# Patient Record
Sex: MALE | Race: WHITE
[De-identification: names, ages, dates, MRNs, and addresses within clinical notes are randomized per-mention and may not be internally consistent; named-entity substitution may affect disease eponyms.]

---

## 2018-06-16 ENCOUNTER — HOSPITAL ENCOUNTER (EMERGENCY)
Dept: HOSPITAL 97 - ER | Age: 43
Discharge: ANOTHER HOSPITAL | End: 2018-06-16
Payer: COMMERCIAL

## 2018-06-16 VITALS — SYSTOLIC BLOOD PRESSURE: 121 MMHG | OXYGEN SATURATION: 98 % | DIASTOLIC BLOOD PRESSURE: 78 MMHG

## 2018-06-16 VITALS — TEMPERATURE: 97.7 F

## 2018-06-16 DIAGNOSIS — T15.01XA: ICD-10-CM

## 2018-06-16 DIAGNOSIS — F17.210: ICD-10-CM

## 2018-06-16 DIAGNOSIS — D69.3: Primary | ICD-10-CM

## 2018-06-16 LAB
ALBUMIN SERPL BCP-MCNC: 3.8 G/DL (ref 3.2–5.5)
ALP SERPL-CCNC: 79 IU/L (ref 42–121)
ALT SERPL W P-5'-P-CCNC: 19 IU/L (ref 10–60)
AST SERPL W P-5'-P-CCNC: 26 IU/L (ref 10–42)
BLD SMEAR INTERP: (no result)
BUN BLD-MCNC: 11 MG/DL (ref 6–20)
GLUCOSE SERPLBLD-MCNC: 96 MG/DL (ref 65–120)
HCT VFR BLD CALC: 45.9 % (ref 39.6–49)
LIPASE SERPL-CCNC: 32 U/L (ref 22–51)
LYMPHOCYTES # SPEC AUTO: 2 K/UL (ref 0.7–4.9)
MCH RBC QN AUTO: 28.9 PG (ref 27–35)
MCV RBC: 85.6 FL (ref 80–100)
MORPHOLOGY BLD-IMP: (no result)
PMV BLD: 11.8 FL (ref 7.6–11.3)
POTASSIUM SERPL-SCNC: 4.3 MEQ/L (ref 3.6–5)
RBC # BLD: 5.37 M/UL (ref 4.33–5.43)

## 2018-06-16 PROCEDURE — 08C8XZZ EXTIRPATION OF MATTER FROM RIGHT CORNEA, EXTERNAL APPROACH: ICD-10-PCS

## 2018-06-16 PROCEDURE — 85025 COMPLETE CBC W/AUTO DIFF WBC: CPT

## 2018-06-16 PROCEDURE — 70450 CT HEAD/BRAIN W/O DYE: CPT

## 2018-06-16 PROCEDURE — 83690 ASSAY OF LIPASE: CPT

## 2018-06-16 PROCEDURE — 99285 EMERGENCY DEPT VISIT HI MDM: CPT

## 2018-06-16 PROCEDURE — 36415 COLL VENOUS BLD VENIPUNCTURE: CPT

## 2018-06-16 PROCEDURE — 80076 HEPATIC FUNCTION PANEL: CPT

## 2018-06-16 PROCEDURE — 80048 BASIC METABOLIC PNL TOTAL CA: CPT

## 2018-06-16 NOTE — RAD REPORT
EXAM DESCRIPTION:  CT - Head Brain Wo Cont - 6/16/2018 4:32 pm

 

CLINICAL HISTORY:  Headache

 

COMPARISON:  November 2017

 

TECHNIQUE:  Computed axial tomography of the head was obtained. IV contrast was not requested.

 

All CT scans are performed using dose optimization technique as appropriate and may include automated
 exposure control or mA/KV adjustment according to patient size.

 

FINDINGS:  An intracranial  bleed is not seen .

 

The ventricles are normal in caliber.

 

No extra-axial fluid collection is noted.

 

Moderate mucoperiosteal thickening of the ethmoid sinus is present

 

IMPRESSION:  No acute intracranial abnormality is seen. If patient's symptoms persist  MRI of the bra
in would be recommended.

 

Moderate chronic ethmoid sinusitis

## 2018-06-16 NOTE — XMS REPORT
[facility]

 Created on:2018



Patient:Lisandro Good

Sex:Male

:1975

External Reference #:[id number]





Demographics







 Address  [address] [place] [id number]

 

 Phone  [phone]

 

 Preferred Language  en

 

 Marital Status  Unknown

 

 Oriental orthodox Affiliation  Unknown

 

 Race  Other Race

 

 Ethnic Group  Not  or 









Author







 Organization  [employer]









Care Team Providers







 Name  Role  Phone

 

 John Benítez  Provider Role  Unavailable









Allergies

No Known Allergies



Problems







 Problem Type  Condition  Code  Onset Dates  Condition Status

 

 Problem  Depression with anxiety  F41.8    Active

 

 Problem  Condyloma acuminata  A63.0    Active

 

 Problem  Thrombocytopenia  D69.6    Active

 

 Problem  Migraine without aura and without  G43.009    Active



   status migrainosus, not intractable      

 

 Problem  Chronic ITP (idiopathic  D69.3    Active



   thrombocytopenia)      

 

 Problem  Tobacco use disorder  F17.200    Active

 

 Problem  Osteoarthritis of multiple joints  M15.9    Active







Medications

No Known Medications



Results

No Known Results



Summary Purpose

[employer] Submission

## 2018-06-16 NOTE — XMS REPORT
[facility]

 Created on:2018



Patient:Lisandro Good

Sex:Male

:1975

External Reference #:[id number]





Demographics







 Address  [address] [place] [address]



   [place] [address]

 

 Phone  [phone]

 

 Preferred Language  en

 

 Marital Status  Unknown

 

 Moravian Affiliation  Unknown

 

 Race  Other Race

 

 Ethnic Group  Not  or 









Author







 Organization  [employer]









Care Team Providers







 Name  Role  Phone

 

 John Benítez  Provider Role  Unavailable









Allergies, Adverse Reactions, Alerts







 Substance  Reaction  Event Type

 

 N.K.D.A.  Info Not Available  Non Drug Allergy







Problems







 Problem Type  Condition  Code  Onset Dates  Condition Status

 

 Assessment  Depression with anxiety  F41.8    Active

 

 Assessment  Tooth pain  K08.89    Active

 

 Assessment  Chronic ITP (idiopathic  D69.3    Active



   thrombocytopenia)      

 

 Problem  Depression with anxiety  F41.8    Active

 

 Problem  Condyloma acuminata  A63.0    Active

 

 Problem  Thrombocytopenia  D69.6    Active

 

 Problem  Migraine without aura and without  G43.009    Active



   status migrainosus, not intractable      

 

 Problem  Chronic ITP (idiopathic  D69.3    Active



   thrombocytopenia)      

 

 Problem  Tobacco use disorder  F17.200    Active

 

 Problem  Osteoarthritis of multiple joints  M15.9    Active

 

 Assessment  Condyloma acuminata  A63.0    Active

 

 Assessment  Migraine without aura and without  G43.009    Active



   status migrainosus, not intractable      

 

 Assessment  Tobacco use disorder  F17.200    Active







Medications







 Medication  Code System  Code  Instructions  Start  End Date  Status  Dosage



         Date      

 

 Ibuprofen  NDC  95204043429  400 MG Orally      Active  1 tablet



       Three times a day        with food



               or milk as



               needed

 

 Norco  NDC  [id number]  5-325 MG Orally      Active  1 tablet



       every 6 hrs        as needed







Results

No Known Results



Summary Purpose

[employer] Submission

## 2018-06-16 NOTE — ER
Nurse's Notes                                                                                     

 [facility]                                                                

Name: Lisandro Good                                                                                

Age: 42 yrs                                                                                       

Sex: Male                                                                                         

: 1975                                                                                   

MRN: [id number]                                                                                   

Arrival Date: 2018                                                                          

Time: 10:58                                                                                       

Account#: [id number]                                                                            

Bed 7                                                                                             

Private MD: Osiel Godwin                                                                         

Diagnosis: Immune thrombocytopenic purpura                                                        

                                                                                                  

Presentation:                                                                                     

                                                                                             

11:14 Presenting complaint: Patient states: FB in left eye, vomiting and dizziness started    sv  

      Tuesday, "I think it's my ITP messing with me.". Transition of care: patient was not        

      received from another setting of care. Onset of symptoms was 2018. Care prior      

      to arrival: None.                                                                           

11:14 Method Of Arrival: Ambulatory                                                           sv  

11:14 Acuity: LADY 3                                                                           sv  

21:05 Risk Assessment: Do you want to hurt yourself or someone else? Patient reports no       tl2 

      desire to harm self or others. Initial Sepsis Screen: Does the patient meet any 2           

      criteria? No. Patient's initial sepsis screen is negative. Does the patient have a          

      suspected source of infection? No. Patient's initial sepsis screen is negative.             

                                                                                                  

Historical:                                                                                       

- Allergies:                                                                                      

11:15 No Known Allergies;                                                                     sv  

- Home Meds:                                                                                      

11:15 None [Active];                                                                          sv  

- PMHx:                                                                                           

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  

- PSHx:                                                                                           

11:15 rhinoplasty; Tonsillectomy; Adenoids; Appendectomy;                                     sv  

                                                                                                  

- Immunization history:: Adult Immunizations up to date.                                          

- Social history:: Smoking status: Patient uses tobacco products, smokes one pack                 

  cigarettes per day.                                                                             

- Ebola Screening: : No symptoms or risks identified at this time.                                

                                                                                                  

                                                                                                  

Screenin:50 Abuse screen: Denies threats or abuse. Denies injuries from another. Nutritional        sg  

      screening: No deficits noted. Tuberculosis screening: No symptoms or risk factors           

      identified. Never had TB. Fall Risk None identified.                                        

                                                                                                  

Assessment:                                                                                       

12:10 General: Appears in no apparent distress. comfortable, well groomed, well developed,    sg  

      well nourished, Behavior is calm, cooperative, appropriate for age. Pain: Denies pain.      

      Neuro: Level of Consciousness is awake, alert, obeys commands, Oriented to person,          

      place, time, situation,  are equal bilaterally Moves all extremities. Full             

      function Gait is steady, Speech is normal, Facial symmetry appears normal, Reports          

      dizziness, weakness. Cardiovascular: No deficits noted. Reports fatigue,                    

      lightheadedness, nausea, vomiting, Denies chest pain, shortness of breath, vomiting,        

      Heart tones S1 S2 present Capillary refill is brisk in bilateral fingers Patient's skin     

      is warm and dry. Chest pain is denied. Respiratory: No deficits noted. GI: Abdomen is       

      flat, non-distended, Bowel sounds present X 4 quads. : No signs and/or symptoms were      

      reported regarding the genitourinary system. EENT: Eyes are tearing on left eye a dark      

      colored pinpoint sized veronica is noted on the sclera . Sclera/Cornea are reddened in left     

      eye Reports pain in left eye. Derm: Skin is pink, warm \T\ dry. Musculoskeletal: No signs   

      and/or symptoms reported regarding the musculoskeletal system.                              

19:40 Reassessment: attempted to call report, instructed to call back in 10 minutes.          tl2 

20:25 Reassessment: pt c/o pain, Dr. Duarte VO for another dose of Norco 5. Awaiting        tl2 

      transport.                                                                                  

21:04 Reassessment: Patient appears in no apparent distress at this time. Patient and/or      tl2 

      family updated on plan of care and expected duration. Pain level reassessed. Patient is     

      alert, oriented x 3, equal unlabored respirations, skin warm/dry/pink. Pt stable and        

      ready for transport.                                                                        

                                                                                                  

Vital Signs:                                                                                      

11:15  / 82; Pulse 63; Resp 18; Temp 97.7; Pulse Ox 97% ; Weight 81.65 kg; Height 5 ft. sv  

      11 in. (180.34 cm); Pain 5/10;                                                              

19:48  / 78; Pulse 73; Resp 18; Pulse Ox 98% on R/A;                                    tl2 

11:15 Body Mass Index 25.10 (81.65 kg, 180.34 cm)                                             sv  

                                                                                                  

ED Course:                                                                                        

10:58 Patient arrived in ED.                                                                  sb2 

10:59 Osiel Godwin MD is Private Physician.                                                 sb2 

11:15 Triage completed.                                                                       sv  

11:16 Arm band placed on right wrist. Patient placed in waiting room, Patient notified of     sv  

      wait time.                                                                                  

11:45 Eddie Franklin MD is Attending Physician.                                              gs  

11:48 Teto Mccoy, RN is Primary Nurse.                                                       sg  

13:50 Initial lab(s) drawn, by me, sent to lab. Inserted saline lock: 20 gauge in right       sg  

      antecubital area, using aseptic technique. Blood collected.                                 

16:14 transfer approval from receiving facility.                                              sg  

16:31 CT Head Brain wo Cont In Process Unspecified.                                           EDMS

16:31 CT completed. Patient moved to CT via wheelchair. Patient moved back from CT.           cw1 

21:04 Lisa Villalta, RN is Primary Nurse.                                                      tl2 

21:04 Patient has correct armband on for positive identification. Bed in low position. Call   tl2 

      light in reach. Side rails up X 1.                                                          

21:04 No provider procedures requiring assistance completed. Patient transferred, IV remains  tl2 

      in place.                                                                                   

                                                                                                  

Administered Medications:                                                                         

13:30 Drug: Tetracaine Drops 0.5 % 1 drops {Note: medication administered by .}         

      Route: Ophthalmic; Site: right eye;                                                         

16:30 Drug: Zofran 4 mg Route: PO;                                                            sg  

21:06 Follow up: Response: No adverse reaction; Nausea is decreased                           tl2 

16:30 Drug: Norco 5 mg-325 mg 1 tabs Route: PO;                                               sg  

20:29 Drug: Norco 5 mg-325 mg 1 tabs Route: PO;                                               tl2 

21:06 Follow up: Response: No adverse reaction; Medication administered at discharge.         tl2 

                                                                                                  

                                                                                                  

Outcome:                                                                                          

17:46 ER care complete, transfer ordered by MD.                                                 

21:04 Transferred by ground EMS to [facility], Transfer form completed.             tl2 

21:04 Condition: stable                                                                           

21:04 Discharge instructions given to patient, family, Instructed on the need for transfer.       

21:07 Patient left the ED.                                                                    tl2 

                                                                                                  

Signatures:                                                                                       

Dispatcher [employer]                           EDMS                                                 

Heidi Rodriguez RN RN sv Gay, Steven, RN RN sg Woodley, Crystal                             cw1                                                  

Lisa Villalta RN RN   tl2                                                  

Eddie Franklin MD MD                                                      

Lexi Guadarrama                               sb2                                                  

                                                                                                  

Corrections: (The following items were deleted from the chart)                                    

19:17 12:10 EENT: No signs and/or symptoms were reported regarding the EENT system. sg        sg  

                                                                                                  

**************************************************************************************************

## 2018-06-16 NOTE — EDPHYS
Physician Documentation                                                                           

 [facility]                                                                

Name: Lisandro Good                                                                                

Age: 42 yrs                                                                                       

Sex: Male                                                                                         

: 1975                                                                                   

MRN: [id number]                                                                                   

Arrival Date: 2018                                                                          

Time: 10:58                                                                                       

Account#: [id number]                                                                            

Bed 7                                                                                             

Private MD: Osiel Godwin                                                                         

ED Physician Eddie Franklin                                                                       

HPI:                                                                                              

                                                                                             

17:16 This 42 yrs old  Male presents to ER via Ambulatory with complaints of         gs  

      Vomiting, Dizziness.                                                                        

17:16 Onset: The symptoms/episode began/occurred gradually, 3 day(s) ago. Possible causes:    gs  

      thinks its his itp. The symptoms are aggravated by nothing. The symptoms are alleviated     

      by nothing.                                                                                 

17:32 Associated signs and symptoms: Pertinent negatives: fever. Severity of symptoms: At     gs  

      their worst the symptoms were moderate in the emergency department the symptoms are         

      unchanged. The patient has experienced similar episodes in the past, a few times.           

                                                                                                  

Historical:                                                                                       

- Allergies:                                                                                      

11:15 No Known Allergies;                                                                     sv  

- Home Meds:                                                                                      

11:15 None [Active];                                                                          sv  

- PMHx:                                                                                           

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  

- PSHx:                                                                                           

11:15 rhinoplasty; Tonsillectomy; Adenoids; Appendectomy;                                     sv  

                                                                                                  

- Immunization history:: Adult Immunizations up to date.                                          

- Social history:: Smoking status: Patient uses tobacco products, smokes one pack                 

  cigarettes per day.                                                                             

- Ebola Screening: : No symptoms or risks identified at this time.                                

                                                                                                  

                                                                                                  

ROS:                                                                                              

17:32 Eyes: Positive for foreign body sensation, started 3 days ago, Negative for redness,    gs  

      swelling, vision loss.                                                                      

17:32 All other systems are negative.                                                             

                                                                                                  

Exam:                                                                                             

17:32 Head/Face:  Normocephalic, atraumatic. ENT:  Nares patent. No nasal discharge, no       gs  

      septal abnormalities noted.  Tympanic membranes are normal and external auditory canals     

      are clear.  Oropharynx with no redness, swelling, or masses, exudates, or evidence of       

      obstruction, uvula midline.  Mucous membranes moist. Neck:  Trachea midline, no             

      thyromegaly or masses palpated, and no cervical lymphadenopathy.  Supple, full range of     

      motion without nuchal rigidity, or vertebral point tenderness.  No Meningismus.             

      Chest/axilla:  Normal chest wall appearance and motion.  Nontender with no deformity.       

      No lesions are appreciated. Cardiovascular:  Regular rate and rhythm with a normal S1       

      and S2.  No gallops, murmurs, or rubs.  Normal PMI, no JVD.  No pulse deficits.             

      Respiratory:  Lungs have equal breath sounds bilaterally, clear to auscultation and         

      percussion.  No rales, rhonchi or wheezes noted.  No increased work of breathing, no        

      retractions or nasal flaring. Back:  No spinal tenderness.  No costovertebral               

      tenderness.  Full range of motion. Skin:  Warm, dry with normal turgor.  Normal color       

      with no rashes, no lesions, and no evidence of cellulitis. MS/ Extremity:  Pulses           

      equal, no cyanosis.  Neurovascular intact.  Full, normal range of motion. Neuro:  Awake     

      and alert, GCS 15, oriented to person, place, time, and situation.  Cranial nerves          

      II-XII grossly intact.  Motor strength 5/5 in all extremities.  Sensory grossly intact.     

       Cerebellar exam normal.  Normal gait.                                                      

17:32 Constitutional: The patient appears alert, awake.                                           

17:32 Eyes: Conjunctiva: normal, Corneas: foreign body, on the right, at  9 o'clock, a piece      

      of metal.                                                                                   

17:32 Skin: no rash present.                                                                      

                                                                                                  

Vital Signs:                                                                                      

11:15  / 82; Pulse 63; Resp 18; Temp 97.7; Pulse Ox 97% ; Weight 81.65 kg; Height 5 ft. sv  

      11 in. (180.34 cm); Pain 5/10;                                                              

19:48  / 78; Pulse 73; Resp 18; Pulse Ox 98% on R/A;                                    tl2 

11:15 Body Mass Index 25.10 (81.65 kg, 180.34 cm)                                             sv  

                                                                                                  

Procedures:                                                                                       

17:32 Foreign Body Removal: a piece of metal, from the right eye, cornea without use of slit  gs  

      lamp by needle, The patient tolerated the removal well, only partial removal.               

                                                                                                  

MDM:                                                                                              

13:03 Patient medically screened.                                                               

17:32 Data reviewed: vital signs, nurses notes. ED course: transfer after dr barone and brodie     

      want transferred.                                                                           

                                                                                                  

                                                                                             

13:10 Order name: Basic Metabolic Panel; Complete Time: 14:40                                   

                                                                                             

13:10 Order name: CBC with Diff; Complete Time: 15:01                                           

                                                                                             

13:10 Order name: Hepatic Function; Complete Time: 14:40                                        

                                                                                             

13:10 Order name: Lipase; Complete Time: 14:40                                                  

                                                                                             

14:54 Order name: CBC Smear Scan; Complete Time: 15:01                                        EDMS

                                                                                             

16:22 Order name: CT Head Brain wo Cont; Complete Time: 17:01                                   

                                                                                             

13:10 Order name: IV Saline Lock; Complete Time: 14:03                                          

                                                                                             

13:10 Order name: Labs collected and sent; Complete Time: 14:03                                 

                                                                                                  

Administered Medications:                                                                         

13:30 Drug: Tetracaine Drops 0.5 % 1 drops {Note: medication administered by }         

      Route: Ophthalmic; Site: right eye;                                                         

16:30 Drug: Zofran 4 mg Route: PO;                                                            sg  

21:06 Follow up: Response: No adverse reaction; Nausea is decreased                           tl2 

16:30 Drug: Norco 5 mg-325 mg 1 tabs Route: PO;                                               sg  

20:29 Drug: Norco 5 mg-325 mg 1 tabs Route: PO;                                               tl2 

21:06 Follow up: Response: No adverse reaction; Medication administered at discharge.         tl2 

                                                                                                  

                                                                                                  

Disposition:                                                                                      

18 17:46 Transfer ordered to [facility]. Diagnosis is Immune     

  thrombocytopenic purpura.                                                                       

- Reason for transfer: Higher level of care.                                                      

- Accepting physician is puja.                                                                  

- Condition is Stable.                                                                            

- Problem is an acute exacerbation.                                                               

- Symptoms are unchanged.                                                                         

                                                                                                  

                                                                                                  

                                                                                                  

Signatures:                                                                                       

Dispatcher [employer]                           [facility]                                                 

Heidi Rodriguez RN RN                                                      

Teto Mccoy RN RN                                                      

Lisa Villalta RN RN   2                                                  

Eddie Franklin MD MD                                                      

                                                                                                  

Corrections: (The following items were deleted from the chart)                                    

21:07 17:46 2018 17:46 Transfer ordered to [facility].       tl2 

      Diagnosis is Immune thrombocytopenic purpura. Reason for transfer: Higher level of          

      care. Accepting physician is puja. Condition is Stable. Problem is an acute               

      exacerbation. Symptoms are unchanged.                                                     

                                                                                                  

**************************************************************************************************

## 2019-09-27 ENCOUNTER — HOSPITAL ENCOUNTER (EMERGENCY)
Dept: HOSPITAL 97 - ER | Age: 44
Discharge: HOME | End: 2019-09-27
Payer: COMMERCIAL

## 2019-09-27 DIAGNOSIS — D69.3: Primary | ICD-10-CM

## 2019-09-27 DIAGNOSIS — F17.210: ICD-10-CM

## 2019-09-27 LAB
HCT VFR BLD CALC: 46.7 % (ref 39.6–49)
LYMPHOCYTES # SPEC AUTO: 2.9 K/UL (ref 0.7–4.9)
PMV BLD: 10.9 FL (ref 7.6–11.3)
RBC # BLD: 5.44 M/UL (ref 4.33–5.43)

## 2019-09-27 PROCEDURE — 36430 TRANSFUSION BLD/BLD COMPNT: CPT

## 2019-09-27 PROCEDURE — 86850 RBC ANTIBODY SCREEN: CPT

## 2019-09-27 PROCEDURE — 30233R1 TRANSFUSION OF NONAUTOLOGOUS PLATELETS INTO PERIPHERAL VEIN, PERCUTANEOUS APPROACH: ICD-10-PCS

## 2019-09-27 PROCEDURE — 85025 COMPLETE CBC W/AUTO DIFF WBC: CPT

## 2019-09-27 PROCEDURE — 36415 COLL VENOUS BLD VENIPUNCTURE: CPT

## 2019-09-27 PROCEDURE — 86901 BLOOD TYPING SEROLOGIC RH(D): CPT

## 2019-09-27 PROCEDURE — 96374 THER/PROPH/DIAG INJ IV PUSH: CPT

## 2019-09-27 PROCEDURE — 86900 BLOOD TYPING SEROLOGIC ABO: CPT

## 2019-09-27 PROCEDURE — 99285 EMERGENCY DEPT VISIT HI MDM: CPT

## 2019-09-27 NOTE — EDPHYS
Physician Documentation                                                                           

 [facility]                                                                 

Name: Lisandro Good                                                                                

Age: 44 yrs                                                                                       

Sex: Male                                                                                         

: 1975                                                                                   

MRN: [id number]                                                                                   

Arrival Date: 2019                                                                          

Time: 12:11                                                                                       

Account#: [id number]                                                                            

Bed 24                                                                                            

Private MD:                                                                                       

ED Physician Mahendra Benoit                                                                         

HPI:                                                                                              

                                                                                             

12:31 This 44 yrs old  Male presents to ER via Ambulatory with complaints of         jr8 

      Abnormal Lab Results.                                                                       

12:31 Onset: The symptoms/episode began/occurred today. The patient has experienced similar   jr8 

      episodes in the past. The patient has been recently seen by a physician: Dr. rodriguez. Pt       

      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 Sent to ER for             

      steroids, pt denies any new onset pain or bleeding.                                         

                                                                                                  

Historical:                                                                                       

- Allergies:                                                                                      

12:13 No Known Allergies;                                                                     aj1 

- Home Meds:                                                                                      

12:13 None [Active];                                                                          aj1 

- PMHx:                                                                                           

12:13 ITP;                                                                                    aj1 

- PSHx:                                                                                           

12:13 Appendectomy;                                                                           aj1 

                                                                                                  

- Immunization history:: Flu vaccine is not up to date.                                           

- Social history:: Smoking status: Patient uses tobacco products, smokes two packs                

  cigarettes per day.                                                                             

- Ebola Screening: : Patient denies travel to an Ebola-affected area in the 21 days               

  before illness onset.                                                                           

                                                                                                  

                                                                                                  

ROS:                                                                                              

12:31 Constitutional: Negative for fever, chills, and weight loss, Eyes: Negative for injury, jr8 

      pain, redness, and discharge, ENT: Negative for injury, pain, and discharge, Neck:          

      Negative for injury, pain, and swelling, Cardiovascular: Negative for chest pain,           

      palpitations, and edema, Respiratory: Negative for shortness of breath, cough,              

      wheezing, and pleuritic chest pain, Abdomen/GI: Negative for abdominal pain, nausea,        

      vomiting, diarrhea, and constipation, MS/Extremity: Negative for injury and deformity,      

      Neuro: Negative for headache, weakness, numbness, tingling, and seizure.                    

                                                                                                  

Exam:                                                                                             

12:31 Constitutional:  This is a well developed, well nourished patient who is awake, alert,  jr8 

      and in no acute distress. Head/Face:  Normocephalic, atraumatic. Eyes:  Pupils equal        

      round and reactive to light, extra-ocular motions intact.  Lids and lashes normal.          

      Conjunctiva and sclera are non-icteric and not injected.  Cornea within normal limits.      

      Periorbital areas with no swelling, redness, or edema. ENT:  Nares patent. No nasal         

      discharge, no septal abnormalities noted.  Tympanic membranes are normal and external       

      auditory canals are clear.  Oropharynx with no redness, swelling, or masses, exudates,      

      or evidence of obstruction, uvula midline.  Mucous membranes moist. Neck:  Trachea          

      midline, no thyromegaly or masses palpated, and no cervical lymphadenopathy.  Supple,       

      full range of motion without nuchal rigidity, or vertebral point tenderness.  No            

      Meningismus. Chest/axilla:  Normal chest wall appearance and motion.  Nontender with no     

      deformity.  No lesions are appreciated. Cardiovascular:  Regular rate and rhythm with a     

      normal S1 and S2.  No gallops, murmurs, or rubs.  Normal PMI, no JVD.  No pulse             

      deficits. Respiratory:  Lungs have equal breath sounds bilaterally, clear to                

      auscultation and percussion.  No rales, rhonchi or wheezes noted.  No increased work of     

      breathing, no retractions or nasal flaring. Abdomen/GI:  Soft, non-tender, with normal      

      bowel sounds.  No distension or tympany.  No guarding or rebound.  No evidence of           

      tenderness throughout. Back:  No spinal tenderness.  No costovertebral tenderness.          

      Full range of motion. Skin:  Warm, dry with normal turgor.  Normal color with no            

      rashes, no lesions, and no evidence of cellulitis. Neuro:  Awake and alert, GCS 15,         

      oriented to person, place, time, and situation.  Cranial nerves II-XII grossly intact.      

      Motor strength 5/5 in all extremities.  Sensory grossly intact.  Cerebellar exam            

      normal.  Normal gait.                                                                       

                                                                                                  

Vital Signs:                                                                                      

12:13  / 75; Pulse 71; Resp 18; Temp 98.2; Pulse Ox 97% on R/A; Weight 81.65 kg (R);    aj1 

      Height 5 ft. 10 in. (177.80 cm) (R); Pain 0/10;                                             

13:30  / 89; Pulse 69; Resp 18; Temp 98.4; Pulse Ox 100% on R/A;                        mg2 

14:30  / 70; Pulse 71; Resp 18; Pulse Ox 100% ;                                         mg2 

15:40  / 92; Pulse 71; Resp 18; Pulse Ox 100% on R/A;                                   mg2 

16:20  / 71; Pulse 70; Resp 18; Temp 98; Pulse Ox 100% on R/A;                          mg2 

12:13 Body Mass Index 25.83 (81.65 kg, 177.80 cm)                                             aj1 

                                                                                                  

MDM:                                                                                              

12:41 Patient medically screened.                                                             jr8 

16:29 Data reviewed: vital signs, nurses notes, lab test result(s), and as a result, I will   jr8 

      discharge patient. Data interpreted: Pulse oximetry: on room air is 100 %.                  

      Interpretation: normal. Counseling: I had a detailed discussion with the patient and/or     

      guardian regarding: the historical points, exam findings, and any diagnostic results        

      supporting the discharge/admit diagnosis, lab results, the need for outpatient follow       

      up, oncology, to return to the emergency department if symptoms worsen or persist or if     

      there are any questions or concerns that arise at home. ED course: Spoke with Dr. Rodriguez      

      who wants patient to have 1 unit of platelets and then to be discharged home. Patient       

      good with this plan.                                                                        

                                                                                                  

                                                                                             

12:28 Order name: CBC with Diff; Complete Time: 13:23                                         eb  

                                                                                             

13:27 Order name: TS                                                                          [facility] 

                                                                                             

13:39 Order name: Platelets, Leukored Pheresis                                                EDMS

                                                                                             

15:06 Order name: ABO/RH no charge; Complete Time: 15:59                                      EDMS

                                                                                                  

Administered Medications:                                                                         

15:47 Drug: Zofran 4 mg Route: IVP; Site: right antecubital;                                  mg2 

17:00 Follow up: Response: No adverse reaction; Marked relief of symptoms                     mg2 

15:47 Drug: Tylenol 1000 mg Route: PO;                                                        mg2 

17:00 Follow up: Response: No adverse reaction; Marked relief of symptoms                     mg2 

                                                                                                  

                                                                                                  

Disposition:                                                                                      

18:32 Co-signature as Attending Physician, Mahenrda Benoit MD.                                    rn  

                                                                                                  

Disposition:                                                                                      

19 16:34 Discharged to Home. Impression: ITP.                                               

- Condition is Stable.                                                                            

- Discharge Instructions: Platelet Transfusion, Platelet Transfusion, Care After.                 

                                                                                                  

- Medication Reconciliation Form, Thank You Letter form.                                          

- Follow up: Private Physician; When: 2 - 3 days; Reason: Recheck today's complaints,             

  Continuance of care, Re-evaluation by your physician.                                           

- Problem is chronic.                                                                             

- Symptoms are unchanged.                                                                         

                                                                                                  

                                                                                                  

                                                                                                  

Signatures:                                                                                       

[facility]                                                 

Pina Espinoza RN RN   aj1                                                  

Mahendra Benoit MD MD rn Roszak, Josh, PA PA   jr8                                                  

Gardose, Silas, RN                    RN   mg2                                                  

                                                                                                  

Corrections: (The following items were deleted from the chart)                                    

13:30 13:28 TYPE AND SCREEN+BB.LAB.BRZ ordered. EDMS                                          EDMS

13:30 13:28 ABO/RH TYPING+BB.LAB.BRZ ordered. EDMS                                            EDMS

17:00 16:34 2019 16:34 Discharged to Home. Impression: ITP. Condition is Stable. Forms  mg2 

      are Medication Reconciliation Form, Thank You Letter, Antibiotic Education,                 

      Prescription Opioid Use. Follow up: Private Physician; When: 2 - 3 days; Reason:            

      Recheck today's complaints, Continuance of care, Re-evaluation by your physician.           

      Problem is chronic. Symptoms are unchanged. jr8                                             

                                                                                                  

**************************************************************************************************

## 2019-09-27 NOTE — ER
Nurse's Notes                                                                                     

 [facility]                                                                 

Name: Lisandro Good                                                                                

Age: 44 yrs                                                                                       

Sex: Male                                                                                         

: 1975                                                                                   

MRN: [id number]                                                                                   

Arrival Date: 2019                                                                          

Time: 12:11                                                                                       

Account#: [id number]                                                                            

Bed 24                                                                                            

Private MD:                                                                                       

Diagnosis: ITP                                                                                    

                                                                                                  

Presentation:                                                                                     

                                                                                             

12:12 Presenting complaint: Patient states: His doctor told him that his platelet count was   aj1 

      low and he needed to come in for a transfusion. Transition of care: patient was not         

      received from another setting of care. Onset of symptoms was 2019. Risk       

      Assessment: Do you want to hurt yourself or someone else? Patient reports no desire to      

      harm self or others. Initial Sepsis Screen: Does the patient meet any 2 criteria? No.       

      Patient's initial sepsis screen is negative. Does the patient have a suspected source       

      of infection? No. Patient's initial sepsis screen is negative. Care prior to arrival:       

      None.                                                                                       

12:12 Method Of Arrival: Ambulatory                                                           aj1 

12:12 Acuity: LADY 3                                                                           aj1 

                                                                                                  

Triage Assessment:                                                                                

12:13 General: Appears in no apparent distress. comfortable, Behavior is calm, cooperative,   aj1 

      appropriate for age. Pain: Denies pain.                                                     

                                                                                                  

Historical:                                                                                       

- Allergies:                                                                                      

12:13 No Known Allergies;                                                                     aj1 

- Home Meds:                                                                                      

12:13 None [Active];                                                                          aj1 

- PMHx:                                                                                           

12:13 ITP;                                                                                    aj1 

- PSHx:                                                                                           

12:13 Appendectomy;                                                                           aj1 

                                                                                                  

- Immunization history:: Flu vaccine is not up to date.                                           

- Social history:: Smoking status: Patient uses tobacco products, smokes two packs                

  cigarettes per day.                                                                             

- Ebola Screening: : Patient denies travel to an Ebola-affected area in the 21 days               

  before illness onset.                                                                           

                                                                                                  

                                                                                                  

Screenin:22 Abuse screen: Denies threats or abuse. Denies injuries from another. Nutritional        mg2 

      screening: No deficits noted. Tuberculosis screening: No symptoms or risk factors           

      identified. Fall Risk IV access (20 points).                                                

                                                                                                  

Assessment:                                                                                       

12:22 General: Appears in no apparent distress. comfortable, Behavior is calm, cooperative.   mg2 

      Pain: Complains of pain in chest and abdomen Pain does not radiate. Pain currently is 2     

      out of 10 on a pain scale. Quality of pain is described as aching, Pain began               

      gradually, Is intermittent, chronic. Neuro: Level of Consciousness is awake, alert,         

      obeys commands, Oriented to person, place, time, situation. Cardiovascular: Capillary       

      refill < 3 seconds Patient's skin is warm and dry. Respiratory: Airway is patent            

      Respiratory effort is even, unlabored, Respiratory pattern is regular, symmetrical. GI:     

      Reports lower abdominal pain, upper abdominal pain. : No signs and/or symptoms were       

      reported regarding the genitourinary system. EENT: Reports. Derm: Skin is intact, is        

      healthy with good turgor, Skin is pink, warm \T\ dry. normal. Musculoskeletal:              

      Circulation, motion, and sensation intact. Capillary refill < 3 seconds.                    

13:39 Reassessment: provider spoke to the patient about the need for platelet transfusion.    mg2 

      patient agreed. type and screen sent to the lab.                                            

15:38 Reassessment: patient complained of n/v during the first 10 min of transfusion.         mg2 

      transfusion stopped. ns started. zofran given as velasquez provider's order. patient             

      monitored closely. provider ordered to just continue giving the platelet.                   

16:55 Reassessment: Patient appears in no apparent distress at this time. Patient and/or      mg2 

      family updated on plan of care and expected duration. Pain level reassessed. Patient is     

      alert, oriented x 3, equal unlabored respirations, skin warm/dry/pink.                      

                                                                                                  

Vital Signs:                                                                                      

12:13  / 75; Pulse 71; Resp 18; Temp 98.2; Pulse Ox 97% on R/A; Weight 81.65 kg (R);    aj1 

      Height 5 ft. 10 in. (177.80 cm) (R); Pain 0/10;                                             

13:30  / 89; Pulse 69; Resp 18; Temp 98.4; Pulse Ox 100% on R/A;                        mg2 

14:30  / 70; Pulse 71; Resp 18; Pulse Ox 100% ;                                         mg2 

15:40  / 92; Pulse 71; Resp 18; Pulse Ox 100% on R/A;                                   mg2 

16:20  / 71; Pulse 70; Resp 18; Temp 98; Pulse Ox 100% on R/A;                          mg2 

12:13 Body Mass Index 25.83 (81.65 kg, 177.80 cm)                                             aj 

                                                                                                  

ED Course:                                                                                        

12:11 Patient arrived in ED.                                                                  mr  

12:13 Triage completed.                                                                       aj1 

12:13 Arm band placed on Patient placed in an exam room.                                      aj1 

12:16 Silas Posada, RN is Primary Nurse.                                                  mg2 

12:18 Franki Pratt PA is PHCP.                                                               jr8 

12:18 Mahendra Benoit MD is Attending Physician.                                                jr8 

12:23 Patient has correct armband on for positive identification. Cardiac monitor on. Pulse   mg2 

      ox on. NIBP on. Door closed. Warm blanket given.                                            

12:30 No provider procedures requiring assistance completed. Inserted saline lock: 20 gauge   mg2 

      in right antecubital area, using aseptic technique. Blood collected.                        

16:58 IV discontinued, intact, bleeding controlled, No redness/swelling at site. Pressure     mg2 

      dressing applied.                                                                           

                                                                                                  

Administered Medications:                                                                         

15:47 Drug: Zofran 4 mg Route: IVP; Site: right antecubital;                                  mg2 

17:00 Follow up: Response: No adverse reaction; Marked relief of symptoms                     mg2 

15:47 Drug: Tylenol 1000 mg Route: PO;                                                        mg2 

17:00 Follow up: Response: No adverse reaction; Marked relief of symptoms                     mg2 

                                                                                                  

                                                                                                  

Medication:                                                                                       

16:58 Blood products: Platelets X 1 unit given. blood unit number- [id number] See          mg2 

      transfusion record.                                                                         

                                                                                                  

Outcome:                                                                                          

16:34 Discharge ordered by MD.                                                                jr8 

16:59 Discharged to home ambulatory.                                                          mg2 

16:59 Condition: good                                                                             

16:59 Discharge instructions given to patient, Instructed on discharge instructions, follow       

      up and referral plans. Demonstrated understanding of instructions, follow-up care.          

17:00 Patient left the ED.                                                                    mg2 

                                                                                                  

Signatures:                                                                                       

Pina Espinoza, RN                     RN   aj1                                                  

Neelima Rubin                                 mr                                                   

Franki Pratt PA                        PA   jr8                                                  

Silas Posada RN                    RN   mg2                                                  

                                                                                                  

Corrections: (The following items were deleted from the chart)                                    

16:55 15:38 Reassessment: patient complained of n/v during the first 10 min of transfusion.   mg2 

      transfusion stopped. ns started. zofran given as velasquez provider's order. patient             

      monitored closely. mg2                                                                      

                                                                                                  

**************************************************************************************************

## 2019-11-13 ENCOUNTER — HOSPITAL ENCOUNTER (OUTPATIENT)
Dept: HOSPITAL 97 - DS | Age: 44
Discharge: HOME | End: 2019-11-13
Attending: INTERNAL MEDICINE
Payer: COMMERCIAL

## 2019-11-13 ENCOUNTER — HOSPITAL ENCOUNTER (INPATIENT)
Dept: HOSPITAL 97 - ER | Age: 44
LOS: 2 days | Discharge: HOME | DRG: 813 | End: 2019-11-15
Attending: HOSPITALIST | Admitting: HOSPITALIST
Payer: COMMERCIAL

## 2019-11-13 VITALS — TEMPERATURE: 98.4 F | DIASTOLIC BLOOD PRESSURE: 74 MMHG | OXYGEN SATURATION: 94 % | SYSTOLIC BLOOD PRESSURE: 110 MMHG

## 2019-11-13 VITALS — BODY MASS INDEX: 25.8 KG/M2

## 2019-11-13 VITALS — BODY MASS INDEX: 26.1 KG/M2

## 2019-11-13 DIAGNOSIS — D69.3: Primary | ICD-10-CM

## 2019-11-13 DIAGNOSIS — J44.9: ICD-10-CM

## 2019-11-13 DIAGNOSIS — Z79.52: ICD-10-CM

## 2019-11-13 DIAGNOSIS — B18.2: ICD-10-CM

## 2019-11-13 DIAGNOSIS — F17.210: ICD-10-CM

## 2019-11-13 DIAGNOSIS — A63.0: ICD-10-CM

## 2019-11-13 LAB
ALBUMIN SERPL BCP-MCNC: 3.8 G/DL (ref 3.4–5)
ALP SERPL-CCNC: 105 U/L (ref 45–117)
ALT SERPL W P-5'-P-CCNC: 26 U/L (ref 12–78)
AST SERPL W P-5'-P-CCNC: 21 U/L (ref 15–37)
BUN BLD-MCNC: 16 MG/DL (ref 7–18)
GLUCOSE SERPLBLD-MCNC: 102 MG/DL (ref 74–106)
HCT VFR BLD CALC: 46.1 % (ref 39.6–49)
INR BLD: 1.06
LYMPHOCYTES # SPEC AUTO: 2.1 K/UL (ref 0.7–4.9)
MAGNESIUM SERPL-MCNC: 2.1 MG/DL (ref 1.8–2.4)
MORPHOLOGY BLD-IMP: (no result)
NT-PROBNP SERPL-MCNC: 20 PG/ML (ref ?–125)
PMV BLD: 11.4 FL (ref 7.6–11.3)
PMV BLD: 9.9 FL (ref 7.6–11.3)
POTASSIUM SERPL-SCNC: 3.7 MMOL/L (ref 3.5–5.1)
RBC # BLD: 5.27 M/UL (ref 4.33–5.43)
TROPONIN (EMERG DEPT USE ONLY): < 0.02 NG/ML (ref 0–0.04)

## 2019-11-13 PROCEDURE — 84484 ASSAY OF TROPONIN QUANT: CPT

## 2019-11-13 PROCEDURE — 85025 COMPLETE CBC W/AUTO DIFF WBC: CPT

## 2019-11-13 PROCEDURE — 99285 EMERGENCY DEPT VISIT HI MDM: CPT

## 2019-11-13 PROCEDURE — 96374 THER/PROPH/DIAG INJ IV PUSH: CPT

## 2019-11-13 PROCEDURE — 86901 BLOOD TYPING SEROLOGIC RH(D): CPT

## 2019-11-13 PROCEDURE — 83880 ASSAY OF NATRIURETIC PEPTIDE: CPT

## 2019-11-13 PROCEDURE — 80048 BASIC METABOLIC PNL TOTAL CA: CPT

## 2019-11-13 PROCEDURE — 84100 ASSAY OF PHOSPHORUS: CPT

## 2019-11-13 PROCEDURE — 96361 HYDRATE IV INFUSION ADD-ON: CPT

## 2019-11-13 PROCEDURE — 36430 TRANSFUSION BLD/BLD COMPNT: CPT

## 2019-11-13 PROCEDURE — 36415 COLL VENOUS BLD VENIPUNCTURE: CPT

## 2019-11-13 PROCEDURE — 94760 N-INVAS EAR/PLS OXIMETRY 1: CPT

## 2019-11-13 PROCEDURE — 93005 ELECTROCARDIOGRAM TRACING: CPT

## 2019-11-13 PROCEDURE — 86900 BLOOD TYPING SEROLOGIC ABO: CPT

## 2019-11-13 PROCEDURE — 84443 ASSAY THYROID STIM HORMONE: CPT

## 2019-11-13 PROCEDURE — 83735 ASSAY OF MAGNESIUM: CPT

## 2019-11-13 PROCEDURE — 85610 PROTHROMBIN TIME: CPT

## 2019-11-13 PROCEDURE — 71045 X-RAY EXAM CHEST 1 VIEW: CPT

## 2019-11-13 PROCEDURE — 74177 CT ABD & PELVIS W/CONTRAST: CPT

## 2019-11-13 PROCEDURE — 87389 HIV-1 AG W/HIV-1&-2 AB AG IA: CPT

## 2019-11-13 PROCEDURE — 85049 AUTOMATED PLATELET COUNT: CPT

## 2019-11-13 PROCEDURE — 80076 HEPATIC FUNCTION PANEL: CPT

## 2019-11-13 PROCEDURE — 86850 RBC ANTIBODY SCREEN: CPT

## 2019-11-13 NOTE — XMS REPORT
[facility]

 Created on:2018



Patient:Lisandro Good

Sex:Male

:1975

External Reference #:[id number]





Demographics







 Address  [address] [place] [address]



   [place] [address]

 

 Phone  [phone]

 

 Preferred Language  en

 

 Marital Status  Unknown

 

 Gnosticist Affiliation  Unknown

 

 Race  Other Race

 

 Ethnic Group  Not  or 









Author







 Organization  [employer]









Care Team Providers







 Name  Role  Phone

 

 John Benítez  Provider Role  Unavailable









Allergies, Adverse Reactions, Alerts







 Substance  Reaction  Event Type

 

 N.K.D.A.  Info Not Available  Non Drug Allergy







Problems







 Problem Type  Condition  Code  Onset Dates  Condition Status

 

 Assessment  Depression with anxiety  F41.8    Active

 

 Assessment  Tooth pain  K08.89    Active

 

 Assessment  Chronic ITP (idiopathic  D69.3    Active



   thrombocytopenia)      

 

 Problem  Depression with anxiety  F41.8    Active

 

 Problem  Condyloma acuminata  A63.0    Active

 

 Problem  Thrombocytopenia  D69.6    Active

 

 Problem  Migraine without aura and without  G43.009    Active



   status migrainosus, not intractable      

 

 Problem  Chronic ITP (idiopathic  D69.3    Active



   thrombocytopenia)      

 

 Problem  Tobacco use disorder  F17.200    Active

 

 Problem  Osteoarthritis of multiple joints  M15.9    Active

 

 Assessment  Condyloma acuminata  A63.0    Active

 

 Assessment  Migraine without aura and without  G43.009    Active



   status migrainosus, not intractable      

 

 Assessment  Tobacco use disorder  F17.200    Active







Medications







 Medication  Code System  Code  Instructions  Start  End Date  Status  Dosage



         Date      

 

 Ibuprofen  NDC  [id number]  400 MG Orally      Active  1 tablet



       Three times a day        with food



               or milk as



               needed

 

 Norco  NDC  [id number]  5-325 MG Orally      Active  1 tablet



       every 6 hrs        as needed







Results

No Known Results



Summary Purpose

[employer] Submission

## 2019-11-13 NOTE — ER
Nurse's Notes                                                                                     

 [facility]                                                                 

Name: Lisandro Good                                                                                

Age: 44 yrs                                                                                       

Sex: Male                                                                                         

: 1975                                                                                   

MRN: [id number]                                                                                   

Arrival Date: 2019                                                                          

Time: 18:49                                                                                       

Account#: [id number]                                                                            

Bed 18                                                                                            

Private MD:                                                                                       

Diagnosis: Thrombocytopenia, unspecified-ITP;Encounter for screening for human papillomavirus     

  (HPV)-EXTENSIVE RECTAL;Elevated white blood cell count                                          

                                                                                                  

Presentation:                                                                                     

                                                                                             

19:31 Presenting complaint: Patient states: "I have ITP, I went to the hematologist and my    aj1 

      PLT was 11, I've been taking prednisone, and she said that my kidneys weren't working       

      right, so I went to day surgery and they gave me an infusion of platelets. Dr. Rodriguez         

      want immunoglobulin, and she said that she could either do it over there or here, and       

      when I told her that I had blood in my stool she told me to come over here.".               

      Transition of care: patient was not received from another setting of care. Onset of         

      symptoms was 2019. Risk Assessment: Do you want to hurt yourself or            

      someone else? Patient reports no desire to harm self or others. Initial Sepsis Screen:      

      Does the patient meet any 2 criteria? HR > 90 bpm. No. Patient's initial sepsis screen      

      is negative. Does the patient have a suspected source of infection? No. Patient's           

      initial sepsis screen is negative. Care prior to arrival: None.                             

19:31 Method Of Arrival: Ambulatory                                                           aj1 

19:31 Acuity: LADY 3                                                                           aj1 

                                                                                                  

Triage Assessment:                                                                                

19:37 General: Appears in no apparent distress. comfortable, Behavior is calm, cooperative,   aj1 

      appropriate for age. Pain: Complains of pain in chest and abdomen Pain currently is 4       

      out of 10 on a pain scale. Neuro: Level of Consciousness is awake, alert, obeys             

      commands, Oriented to person, place, time, situation. Neuro: Cardiovascular: Patient's      

      skin is warm and dry. Respiratory: Airway is patent Respiratory effort is even,             

      unlabored, Respiratory pattern is regular, symmetrical. GI: Reports lower abdominal         

      pain, upper abdominal pain, bloody stool.                                                   

                                                                                                  

Historical:                                                                                       

- Allergies:                                                                                      

19:37 No Known Allergies;                                                                     aj1 

- Home Meds:                                                                                      

19:37 Prednisone Oral [Active]; suboxone [Active];                                            aj1 

- PMHx:                                                                                           

19:37 ITP; Hepatitis; C;                                                                      aj1 

- PSHx:                                                                                           

19:37 Appendectomy;                                                                           aj1 

                                                                                                  

- Immunization history:: Flu vaccine is not up to date.                                           

- Social history:: Smoking status: Patient uses tobacco products, smokes two packs                

  cigarettes per day.                                                                             

- Ebola Screening: : Patient denies travel to an Ebola-affected area in the 21 days               

  before illness onset.                                                                           

                                                                                                  

                                                                                                  

Screenin:27 Abuse screen: Denies threats or abuse. Denies injuries from another. Nutritional        rr5 

      screening: No deficits noted. Tuberculosis screening: No symptoms or risk factors           

      identified. Fall Risk IV access (20 points). Total Sandra Fall Scale indicates No Risk       

      (0-24 pts).                                                                                 

                                                                                                  

Assessment:                                                                                       

20:00 General: Appears in no apparent distress. comfortable, Behavior is calm, cooperative,   rr5 

      appropriate for age.                                                                        

20:00 Pain: Complains of pain in abdomen Pain does not radiate. Pain currently is 4 out of 10 rr5 

      on a pain scale. Quality of pain is described as aching, Pain began gradually, Is           

      intermittent. Neuro: Level of Consciousness is awake, alert, obeys commands, Oriented       

      to person, place, time, situation, Appropriate for age. Cardiovascular: Capillary           

      refill < 3 seconds Patient's skin is warm and dry. Respiratory: Airway is patent            

      Respiratory effort is even, unlabored, Respiratory pattern is regular, symmetrical. GI:     

      Reports lower abdominal pain, upper abdominal pain, bloody stool. : No signs and/or       

      symptoms were reported regarding the genitourinary system. EENT: No signs and/or            

      symptoms were reported regarding the EENT system. Derm: Skin is intact, Skin                

      temperature is warm. Musculoskeletal: Circulation, motion, and sensation intact.            

      Capillary refill < 3 seconds.                                                               

21:00 Reassessment: Patient appears in no apparent distress at this time. Patient is alert,   rr5 

      oriented x 3, equal unlabored respirations, skin warm/dry/pink. no complaints made,         

      awaiting for results.                                                                       

22:00 Reassessment: Patient appears in no apparent distress at this time. No changes from     rr5 

      previously documented assessment. Patient and/or family updated on plan of care and         

      expected duration. Pain level reassessed. Reassessment: Patient appears in no apparent      

      distress at this time. Patient is alert, oriented x 3, equal unlabored respirations,        

      skin warm/dry/pink.                                                                         

23:00 Reassessment: Patient appears in no apparent distress at this time. Patient is alert,   rr5 

      oriented x 3, equal unlabored respirations, skin warm/dry/pink. watching TV vitally         

      stable,breathing spontaneously at room air.                                                 

23:40 Reassessment: Patient appears in no apparent distress at this time. Patient is alert,   rr5 

      oriented x 3, equal unlabored respirations, skin warm/dry/pink. send for CT scan            

      assisted by ct staff.                                                                       

                                                                                             

00:59 Reassessment: Patient appears in no apparent distress at this time. Patient and/or      rr5 

      family updated on plan of care and expected duration. Pain level reassessed. awaiting       

      for room assignment.                                                                        

02:00 Reassessment: Patient appears in no apparent distress at this time. Patient is alert,   rr5 

      oriented x 3, equal unlabored respirations, skin warm/dry/pink. awake alert, breathing      

      spontaneously at room air. no complaints made, vitally stable.                              

                                                                                                  

Vital Signs:                                                                                      

                                                                                             

19:37  / 81; Pulse 106; Resp 20; Temp 97.3; Pulse Ox 96% on R/A; Weight 81.65 kg (R);   aj1 

      Height 5 ft. 10 in. (177.80 cm) (R); Pain 4/10;                                             

20:30  / 93; Pulse 98; Resp 17; Pulse Ox 98% ;                                          rr5 

21:30  / 80; Pulse 93; Resp 17; Temp 97.5; Pulse Ox 100% on R/A;                        rr5 

22:30  / 75; Pulse 96; Resp 16; Pulse Ox 98% ;                                          rr5 

23:30  / 79; Pulse 90; Resp 15; Pulse Ox 99% ;                                          rr5 

                                                                                             

00:30  / 80; Pulse 77; Resp 16; Temp 98.3; Pulse Ox 99% ;                               rr5 

01:30  / 70; Pulse 85; Resp 17; Pulse Ox 98% ;                                          rr5 

02:00  / 73; Pulse 75; Resp 16; Temp 97.8; Pulse Ox 98% ;                               rr5 

                                                                                             

19:37 Body Mass Index 25.83 (81.65 kg, 177.80 cm)                                             aj1 

                                                                                                  

ED Course:                                                                                        

                                                                                             

18:49 Patient arrived in ED.                                                                  mr  

19:35 Triage completed.                                                                       aj1 

19:37 Arm band placed on Patient placed in an exam room.                                      aj1 

19:54 Sunil Villa, RN is Primary Nurse.                                                    rr5 

20:22 Herrera Duarte MD is Attending Physician.                                             [facility] 

21:25 Inserted saline lock: 18 gauge in right antecubital area, using aseptic technique.      rr5 

      Blood collected.                                                                            

21:27 Patient has correct armband on for positive identification. Placed in gown. Bed in low  rr5 

      position. Call light in reach. Side rails up X2. Cardiac monitor on. Pulse ox on. NIBP      

      on.                                                                                         

21:33 XRAY Chest (1 view) In Process Unspecified.                                             EDMS

21:53 Notified ED physician of a critical lab result(s). WBC 29.1 and plt 17.                 fc  

23:06 Haroon Hampton is Hospitalizing Provider.                                               [facility] 

23:46 Patient moved to CT via stretcher.                                                        

                                                                                             

00:02 No provider procedures requiring assistance completed.                                  rr5 

00:11 CT completed. Patient tolerated procedure well. Patient moved back from CT.               

00:20 CT Abd/Pelvis - IV Contrast Only In Process Unspecified.                                EDMS

02:30 Patient admitted, IV remains in place. intact, No redness/swelling at site.             rr5 

                                                                                                  

Administered Medications:                                                                         

                                                                                             

21:25 Drug: NS 0.9% 500 ml Route: IV; Rate: bolus; Site: right antecubital;                   rr5 

22:00 Follow up: Response: No adverse reaction; IV Status: Completed infusion; IV Intake:     rr5 

      500ml                                                                                       

21:26 Drug: ProTONIX 40 mg Route: IVP; Site: right antecubital;                               rr5 

22:30 Follow up: Response: No adverse reaction                                                rr5 

22:00 Drug: NS 0.9% 1000 ml Route: IV; Rate: 125 ml/hr; Site: right antecubital;              rr5 

                                                                                             

02:00 Follow up: Response: No adverse reaction; IV Status: Infusion continued upon admission; rr5 

      IV Intake: 500ml                                                                            

                                                                                                  

                                                                                                  

Intake:                                                                                           

                                                                                             

22:00 IV: 500ml; Total: 500ml.                                                                rr5 

                                                                                             

02:00 IV: 500ml; Total: 1000ml.                                                               rr5 

                                                                                                  

Outcome:                                                                                          

                                                                                             

23:08 Decision to Hospitalize by Provider.                                                    [facility] 

                                                                                             

02:25 Admitted to Med/surg accompanied by tech, via stretcher, room 203, Report called to     rr5 

      cosme                                                                                     

      Condition: stable                                                                           

02:25 Instructed on the need for admit.                                                       rr5 

02:42 Patient left the ED.                                                                    rr5 

                                                                                                  

Signatures:                                                                                       

Dispatcher [employer]                           EDPina Rangel RN                     RN   ajHerrera Franco MD MD cha Rivera, Neelima                                 mr                                                   

Jose Luis Elizondo Felicia, RN RN fc Roque, Raymond, RN                      RN   rr5                                                  

                                                                                                  

Corrections: (The following items were deleted from the chart)                                    

02:17 02:00  / ???; Pulse 75bpm; Resp 16bpm; Pulse Ox 98%; Temp 97.8F; rr5              rr5 

                                                                                                  

**************************************************************************************************

## 2019-11-13 NOTE — XMS REPORT
[facility]

 Created on:2018



Patient:Lisandro Good

Sex:Male

:1975

External Reference #:[id number]





Demographics







 Address  [address] [place] [address]

 

 Phone  [phone]

 

 Preferred Language  en

 

 Marital Status  Unknown

 

 Tenriism Affiliation  Unknown

 

 Race  Other Race

 

 Ethnic Group  Not  or 









Author







 Organization  [employer]









Care Team Providers







 Name  Role  Phone

 

 John Benítez  Provider Role  Unavailable









Allergies

No Known Allergies



Problems







 Problem Type  Condition  Code  Onset Dates  Condition Status

 

 Problem  Depression with anxiety  F41.8    Active

 

 Problem  Condyloma acuminata  A63.0    Active

 

 Problem  Thrombocytopenia  D69.6    Active

 

 Problem  Migraine without aura and without  G43.009    Active



   status migrainosus, not intractable      

 

 Problem  Chronic ITP (idiopathic  D69.3    Active



   thrombocytopenia)      

 

 Problem  Tobacco use disorder  F17.200    Active

 

 Problem  Osteoarthritis of multiple joints  M15.9    Active







Medications

No Known Medications



Results

No Known Results



Summary Purpose

[employer] Submission

## 2019-11-13 NOTE — XMS REPORT
[facility]

 Created on:2018



Patient:Lisandro Good

Sex:Male

:1975

External Reference #:[id number]





Demographics







 Address  [address] [place] [address]



   [place] [address]

 

 Phone  [phone]

 

 Preferred Language  en

 

 Marital Status  Unknown

 

 Confucianism Affiliation  Unknown

 

 Race  Other Race

 

 Ethnic Group  Not  or 









Author







 Organization  [employer]









Care Team Providers







 Name  Role  Phone

 

 John Benítez  Provider Role  Unavailable









Allergies, Adverse Reactions, Alerts







 Substance  Reaction  Event Type

 

 N.K.D.A.  Info Not Available  Non Drug Allergy







Problems







 Problem Type  Condition  Code  Onset Dates  Condition Status

 

 Assessment  Depression with anxiety  F41.8    Active

 

 Assessment  Tooth pain  K08.89    Active

 

 Assessment  Chronic ITP (idiopathic  D69.3    Active



   thrombocytopenia)      

 

 Problem  Depression with anxiety  F41.8    Active

 

 Problem  Condyloma acuminata  A63.0    Active

 

 Problem  Thrombocytopenia  D69.6    Active

 

 Problem  Migraine without aura and without  G43.009    Active



   status migrainosus, not intractable      

 

 Problem  Chronic ITP (idiopathic  D69.3    Active



   thrombocytopenia)      

 

 Problem  Tobacco use disorder  F17.200    Active

 

 Problem  Osteoarthritis of multiple joints  M15.9    Active

 

 Assessment  Condyloma acuminata  A63.0    Active

 

 Assessment  Migraine without aura and without  G43.009    Active



   status migrainosus, not intractable      

 

 Assessment  Tobacco use disorder  F17.200    Active







Medications







 Medication  Code System  Code  Instructions  Start  End Date  Status  Dosage



         Date      

 

 Ibuprofen  NDC  32883002375  400 MG Orally      Active  1 tablet



       Three times a day        with food



               or milk as



               needed

 

 Norco  NDC  45403925680  5-325 MG Orally      Active  1 tablet



       every 6 hrs        as needed







Results

No Known Results



Summary Purpose

[employer] Submission

## 2019-11-13 NOTE — XMS REPORT
[facility]

 Created on:2018



Patient:Lisandro Good

Sex:Male

:1975

External Reference #:[id number]





Demographics







 Address  [address] [place] [address]

 

 Phone  [phone]

 

 Preferred Language  en

 

 Marital Status  Unknown

 

 Voodoo Affiliation  Unknown

 

 Race  Other Race

 

 Ethnic Group  Not  or 









Author







 Organization  [employer]









Care Team Providers







 Name  Role  Phone

 

 John Benítez  Provider Role  Unavailable









Allergies

No Known Allergies



Problems







 Problem Type  Condition  Code  Onset Dates  Condition Status

 

 Problem  Depression with anxiety  F41.8    Active

 

 Problem  Condyloma acuminata  A63.0    Active

 

 Problem  Thrombocytopenia  D69.6    Active

 

 Problem  Migraine without aura and without  G43.009    Active



   status migrainosus, not intractable      

 

 Problem  Chronic ITP (idiopathic  D69.3    Active



   thrombocytopenia)      

 

 Problem  Tobacco use disorder  F17.200    Active

 

 Problem  Osteoarthritis of multiple joints  M15.9    Active







Medications

No Known Medications



Results

No Known Results



Summary Purpose

[employer] Submission

## 2019-11-13 NOTE — EDPHYS
Physician Documentation                                                                           

 [facility]                                                                 

Name: Lisandro Good                                                                                

Age: 44 yrs                                                                                       

Sex: Male                                                                                         

: 1975                                                                                   

MRN: [id number]                                                                                   

Arrival Date: 2019                                                                          

Time: 18:49                                                                                       

Account#: [id number]                                                                            

Bed 18                                                                                            

Private MD:                                                                                       

ED Physician Herrera Duarte                                                                      

HPI:                                                                                              

                                                                                             

21:05 This 44 yrs old  Male presents to ER via Ambulatory with complaints of Bloody  tessie 

      Stools.                                                                                     

21:05 This 44 yrs old  Male presents to ER via Ambulatory with complaints of Bloody  tessie 

      Stools.                                                                                     

21:05 The patient presents with abdominal pain in the upper abdomen, in the lower abdomen.    tessie 

      Onset: The symptoms/episode began/occurred 1 day(s) ago. The symptoms do not radiate.       

      Associated signs and symptoms: none. The symptoms are described as achy. Severity of        

      pain: At its worst the pain was mild in the emergency department the pain is unchanged.     

                                                                                                  

Historical:                                                                                       

- Allergies:                                                                                      

19:37 No Known Allergies;                                                                     aj1 

- Home Meds:                                                                                      

19:37 Prednisone Oral [Active]; suboxone [Active];                                            aj1 

- PMHx:                                                                                           

19:37 ITP; Hepatitis; C;                                                                      aj1 

- PSHx:                                                                                           

19:37 Appendectomy;                                                                           aj1 

                                                                                                  

- Immunization history:: Flu vaccine is not up to date.                                           

- Social history:: Smoking status: Patient uses tobacco products, smokes two packs                

  cigarettes per day.                                                                             

- Ebola Screening: : Patient denies travel to an Ebola-affected area in the 21 days               

  before illness onset.                                                                           

                                                                                                  

                                                                                                  

ROS:                                                                                              

21:06 Constitutional: Negative for fever, chills, and weight loss, Eyes: Negative for injury, tessie 

      pain, redness, and discharge, ENT: Negative for injury, pain, and discharge, Neck:          

      Negative for injury, pain, and swelling, Cardiovascular: Negative for chest pain,           

      palpitations, and edema, Respiratory: Negative for shortness of breath, cough,              

      wheezing, and pleuritic chest pain, Back: Negative for injury and pain, : Negative        

      for injury, bleeding, discharge, and swelling, MS/Extremity: Negative for injury and        

      deformity, Skin: Negative for injury, rash, and discoloration, Neuro: Negative for          

      headache, weakness, numbness, tingling, and seizure, Psych: Negative for depression,        

      anxiety, suicide ideation, homicidal ideation, and hallucinations, Allergy/Immunology:      

      Negative for hives, rash, and allergies, Endocrine: Negative for neck swelling,             

      polydipsia, polyuria, polyphagia, and marked weight changes, Hematologic/Lymphatic:         

      Negative for swollen nodes, abnormal bleeding, and unusual bruising.                        

21:06 Abdomen/GI: Positive for abdominal cramps, rectal pain, rectal bleeding.                    

                                                                                                  

Exam:                                                                                             

21:06 Constitutional:  This is a well developed, well nourished patient who is awake, alert,  tessie 

      and in no acute distress. Head/Face:  Normocephalic, atraumatic. Eyes:  Pupils equal        

      round and reactive to light, extra-ocular motions intact.  Lids and lashes normal.          

      Conjunctiva and sclera are non-icteric and not injected.  Cornea within normal limits.      

      Periorbital areas with no swelling, redness, or edema. ENT:  Nares patent. No nasal         

      discharge, no septal abnormalities noted.  Tympanic membranes are normal and external       

      auditory canals are clear.  Oropharynx with no redness, swelling, or masses, exudates,      

      or evidence of obstruction, uvula midline.  Mucous membranes moist. Neck:  Trachea          

      midline, no thyromegaly or masses palpated, and no cervical lymphadenopathy.  Supple,       

      full range of motion without nuchal rigidity, or vertebral point tenderness.  No            

      Meningismus. Chest/axilla:  Normal chest wall appearance and motion.  Nontender with no     

      deformity.  No lesions are appreciated. Cardiovascular:  Regular rate and rhythm with a     

      normal S1 and S2.  No gallops, murmurs, or rubs.  Normal PMI, no JVD.  No pulse             

      deficits. Respiratory:  Lungs have equal breath sounds bilaterally, clear to                

      auscultation and percussion.  No rales, rhonchi or wheezes noted.  No increased work of     

      breathing, no retractions or nasal flaring. Back:  No spinal tenderness.  No                

      costovertebral tenderness.  Full range of motion. Male :  Normal genitalia with no        

      discharge or lesions. Skin:  Warm, dry with normal turgor.  Normal color with no            

      rashes, no lesions, and no evidence of cellulitis. MS/ Extremity:  Pulses equal, no         

      cyanosis.  Neurovascular intact.  Full, normal range of motion. Neuro:  Awake and           

      alert, GCS 15, oriented to person, place, time, and situation.  Cranial nerves II-XII       

      grossly intact.  Motor strength 5/5 in all extremities.  Sensory grossly intact.            

      Cerebellar exam normal.  Normal gait. Psych:  Awake, alert, with orientation to person,     

      place and time.  Behavior, mood, and affect are within normal limits.                       

21:06 Abdomen/GI: Inspection: abdomen appears normal, Bowel sounds: normal, Palpation:            

      abdomen is soft and non-tender, in all quadrants, Rectal exam: rectal tone poor, mass,      

      swelling, tenderness, extensive hpv rectal, Liver: no appreciated palpable                  

      abnormalities, Hernia: not appreciated.                                                     

                                                                                                  

Vital Signs:                                                                                      

19:37  / 81; Pulse 106; Resp 20; Temp 97.3; Pulse Ox 96% on R/A; Weight 81.65 kg (R);   aj1 

      Height 5 ft. 10 in. (177.80 cm) (R); Pain 4/10;                                             

20:30  / 93; Pulse 98; Resp 17; Pulse Ox 98% ;                                          rr5 

21:30  / 80; Pulse 93; Resp 17; Temp 97.5; Pulse Ox 100% on R/A;                        rr5 

22:30  / 75; Pulse 96; Resp 16; Pulse Ox 98% ;                                          rr5 

23:30  / 79; Pulse 90; Resp 15; Pulse Ox 99% ;                                          rr5 

                                                                                             

00:30  / 80; Pulse 77; Resp 16; Temp 98.3; Pulse Ox 99% ;                               rr5 

01:30  / 70; Pulse 85; Resp 17; Pulse Ox 98% ;                                          rr5 

02:00  / 73; Pulse 75; Resp 16; Temp 97.8; Pulse Ox 98% ;                               rr5 

                                                                                             

19:37 Body Mass Index 25.83 (81.65 kg, 177.80 cm)                                             [facility] 

                                                                                                  

MDM:                                                                                              

                                                                                             

20:23 Patient medically screened.                                                             [facility] 

21:08 Data reviewed: vital signs, nurses notes, lab test result(s), EKG, radiologic studies.  [facility] 

                                                                                                  

                                                                                             

21:04 Order name: Basic Metabolic Panel; Complete Time: 23:04                                 [facility] 

                                                                                             

21:04 Order name: CBC with Diff; Complete Time: 23:04                                         [facility] 

                                                                                             

21:04 Order name: LFT's; Complete Time: 23:04                                                 [facility] 

                                                                                             

21:04 Order name: Magnesium; Complete Time: 23:04                                             [facility] 

                                                                                             

21:04 Order name: NT PRO-BNP; Complete Time: 23:04                                            [facility] 

                                                                                             

21:04 Order name: PT-INR; Complete Time: 23:04                                                [facility] 

                                                                                             

21:04 Order name: Troponin (emerg Dept Use Only); Complete Time: 23:04                        [facility] 

                                                                                             

21:04 Order name: XRAY Chest (1 view)                                                         [facility] 

                                                                                             

21:04 Order name: Type And Screen; Complete Time: 23:04                                       [facility] 

                                                                                             

21:56 Order name: Manual Differential; Complete Time: 23:04                                   [facility]

                                                                                             

23:05 Order name: CT Abd/Pelvis - IV Contrast Only                                            [facility] 

                                                                                             

21:04 Order name: EKG; Complete Time: 21:                                                   [facility] 

                                                                                             

21:04 Order name: Cardiac monitoring; Complete Time: 21:                                    [facility] 

                                                                                             

21:04 Order name: EKG - Nurse/Tech; Complete Time: 23:                                      [facility] 

                                                                                             

21:04 Order name: IV Saline Lock; Complete Time: :                                        [facility] 

                                                                                             

21:04 Order name: Labs collected and sent; Complete Time: :                               [facility] 

                                                                                             

21:04 Order name: O2 Per Protocol; Complete Time: :                                       [facility] 

                                                                                             

21:04 Order name: O2 Sat Monitoring; Complete Time: :                                     [facility] 

                                                                                                  

Administered Medications:                                                                         

21:25 Drug: NS 0.9% 500 ml Route: IV; Rate: bolus; Site: right antecubital;                   rr5 

22:00 Follow up: Response: No adverse reaction; IV Status: Completed infusion; IV Intake:     rr5 

      500ml                                                                                       

21:26 Drug: ProTONIX 40 mg Route: IVP; Site: right antecubital;                               rr5 

22:30 Follow up: Response: No adverse reaction                                                rr5 

22:00 Drug: NS 0.9% 1000 ml Route: IV; Rate: 125 ml/hr; Site: right antecubital;              rr5 

                                                                                             

02:00 Follow up: Response: No adverse reaction; IV Status: Infusion continued upon admission; rr5 

      IV Intake: 500ml                                                                            

                                                                                                  

                                                                                                  

Disposition:                                                                                      

19 23:08 Hospitalization ordered by Haroon Hampton for Inpatient Admission. Preliminary     

  diagnosis are Thrombocytopenia, unspecified - ITP, Encounter for screening                      

  for human papillomavirus (HPV) - EXTENSIVE RECTAL, Elevated white blood cell                    

  count.                                                                                          

- Bed requested for Telemetry/MedSurg (Inpatient).                                                

- Status is Inpatient Admission.                                                              rr5 

- Condition is Fair.                                                                              

- Problem is new.                                                                                 

- Symptoms have improved.                                                                         

UTI on Admission? No                                                                              

                                                                                                  

                                                                                                  

                                                                                                  

Signatures:                                                                                       

Dispatcher [employer]                           EDPina Rangel RN                     RN   aj1                                                  

Mansi Montano RN                        RN   dw                                                   

Herrera Duarte MD MD cha Roque, Raymond RN                      RN   rr5                                                  

                                                                                                  

Corrections: (The following items were deleted from the chart)                                    

02:04  23:08 Hospitalization Ordered by Haroon Hampton for Inpatient Admission.           dw  

      Preliminary diagnosis is Thrombocytopenia, unspecified - ITP; Encounter for screening       

      for human papillomavirus (HPV) - EXTENSIVE RECTAL; Elevated white blood cell count. Bed     

      requested for Telemetry/MedSurg (Inpatient). Status is Inpatient Admission. Condition       

      is Fair. Problem is new. Symptoms have improved. UTI on Admission? No. tessie                  

                                                                                             

02:42 02:04 2019 23:08 Hospitalization Ordered by Haroon Hampton for Inpatient           rr5 

      Admission. Preliminary diagnosis is Thrombocytopenia, unspecified - ITP; Encounter for      

      screening for human papillomavirus (HPV) - EXTENSIVE RECTAL; Elevated white blood cell      

      count. Bed requested for Telemetry/MedSurg (Inpatient). Status is Inpatient Admission.      

      Condition is Fair. Problem is new. Symptoms have improved. UTI on Admission? No. dw         

                                                                                                  

**************************************************************************************************

## 2019-11-14 VITALS — OXYGEN SATURATION: 94 %

## 2019-11-14 LAB
BUN BLD-MCNC: 13 MG/DL (ref 7–18)
GLUCOSE SERPLBLD-MCNC: 86 MG/DL (ref 74–106)
HCT VFR BLD CALC: 44.1 % (ref 39.6–49)
LYMPHOCYTES # SPEC AUTO: 4 K/UL (ref 0.7–4.9)
MORPHOLOGY BLD-IMP: (no result)
PMV BLD: 12.6 FL (ref 7.6–11.3)
POTASSIUM SERPL-SCNC: 3.6 MMOL/L (ref 3.5–5.1)
RBC # BLD: 5.05 M/UL (ref 4.33–5.43)

## 2019-11-14 RX ADMIN — Medication SCH: at 09:00

## 2019-11-14 RX ADMIN — SODIUM CHLORIDE SCH MLS: 0.9 INJECTION, SOLUTION INTRAVENOUS at 03:13

## 2019-11-14 RX ADMIN — Medication SCH: at 20:37

## 2019-11-14 RX ADMIN — PANTOPRAZOLE SODIUM SCH MG: 40 TABLET, DELAYED RELEASE ORAL at 16:54

## 2019-11-14 RX ADMIN — SODIUM CHLORIDE SCH: 0.9 INJECTION, SOLUTION INTRAVENOUS at 14:15

## 2019-11-14 NOTE — P.HP
Certification for Inpatient


Patient admitted to: Inpatient


With expected LOS: >2 Midnights


Practitioner: I am a practitioner with admitting privileges, knowledge of 

patient current condition, hospital course, and medical plan of care.


Services: Services provided to patient in accordance with Admission 

requirements found in Title 42 Section 412.3 of the Code of Federal Regulations





Patient History


Date of Service: 11/14/19


Reason for admission: Low platelet count and rectal bleed


History of Present Illness: 


44-year-old gentleman with a history of idiopathic thrombocytopenia, history of 

HPV of anal/rectal area was referred to the emergency department due to low 

platelets and bright red blood per rectum.  Patient saw his hematology 

oncologist Dr. Manzo yesterday, received platelet transfusion for 

thrombocytopenia.  His platelet level came up to 28. He has been on chronic 

prednisone therapy which has been deemed ineffective for his thrombocytopenia. 

Dr. Manzo plan to start him on a IVIG.  His platelet count dropped again to 17 

after the transfusion. Patient was referred to the ED to be admitted for IVIG 

and also to be evaluated for GI bleed.  He reports blood stained tissue paper 

after bowel movement, which he attributes to rectal HPV.  Hemoglobin was 15 and 

white cell count elevated to 29 in the ED.


Allergies





No Known Drug Allergies Allergy (Verified 11/13/19 10:46)


 Unknown


No Known Allergies Allergy (Uncoded 11/13/19 10:46)


 Unknown





Home Medications: 








predniSONE [Prednisone*] 70 mg PO DAILY 12/12/17 








- Past Medical/Surgical History


Diabetic: No


-: ITP


-: HPV


-: Rhinoplasty


-: tonsillectomy


-: adenoids removed


-: appendectomy





- Family History


  ** Father


-: Cancer


Notes: lung cancer





- Social History


Smoking Status: Current every day smoker


Alcohol use: No


CD- Drugs: Yes


Caffeine use: Yes





Review of Systems


Other: 


General:  No fever, no malaise, no unintentional weight loss.


Eyes:  No eye discharge, 


Respiratory:  No cough, no shortness of breath.


CVS:  No chest pain, no palpitation, no lightheadedness.


GI:  No abdominal pain, no nausea no vomit, no constipation, no diarrhea.


Genitourinary:  No dysuria, no urinary frequency, no incontinence, no hematuria.


Musculoskeletal:  No joint pains, or joint swelling, no gait instability.


Neurology:  No headache, no asymmetric, weakness, no problem with swallowing.





Except as documented, all other systems reviewed and negative.











Physical Examination





- Physical Exam


General: Alert, In no apparent distress, Oriented x3


HEENT: Normocephalic, PERRLA, Mucous membr. moist/pink, Sclerae nonicteric


Neck: Supple, JVD not distended, No Thyromegaly


Respiratory: Clear to auscultation bilaterally, Normal air movement


Cardiovascular: No edema, Normal pulses, Regular rate/rhythm, Normal S1 S2, No 

murmurs


Capillary refill: <2 Seconds


Gastrointestinal: Normal bowel sounds, Soft and benign, Non-distended, No 

tenderness


Musculoskeletal: No swelling


Integumentary: No rashes, No erythema


Neurological: Normal speech, Normal strength at 5/5 x4 extr, Cranial nerves 3-

12 intact





- Studies


Laboratory Data (last 24 hrs)





11/13/19 21:25: PT 12.5, INR 1.06


11/13/19 21:25: WBC 29.1 H*, Hgb 15.3, Hct 46.1, Plt Count 17 L* D


11/13/19 21:25: Sodium 136, Potassium 3.7, BUN 16, Creatinine 0.96, Glucose 102

, Magnesium 2.1, Total Bilirubin 0.3, AST 21, ALT 26, Alkaline Phosphatase 105








Assessment and Plan





- Problems (Diagnosis)


(1) Chronic ITP (idiopathic thrombocytopenia)


Onset Date: 11/16/17   Current Visit: No   Status: Acute   





(2) Chronic use of steroids


Current Visit: No   Status: Chronic   





(3) COPD (chronic obstructive pulmonary disease)


Current Visit: No   Status: Acute   


Qualifiers: 


   COPD type: COPD with acute exacerbation   Qualified Code(s): J44.1 - Chronic 

obstructive pulmonary disease with (acute) exacerbation   





(4) Hepatitis C


Current Visit: No   Status: Chronic   


Qualifiers: 


   Viral hepatitis chronicity: unspecified   Hepatic coma status: without 

hepatic coma   Qualified Code(s): B19.20 - Unspecified viral hepatitis C 

without hepatic coma   





(5) Tobacco abuse


Onset Date: 11/16/17   Current Visit: No   Status: Chronic   





- Plan


Admit patient to [facility]


Hematology consult


Hematologist plan to give IVIG in am.


He needs GI evaluation for GI bleed.  This can be done once his platelet count 

have improved with IVIG


Continuing prednisone


Monitor CBC


Monitor for active bleeding and transfused with platelets as needed.


Smoking counseling provided.





- Advance Directives


Does patient have a Living Will: No


Does patient have a Durable POA for Healthcare: No

## 2019-11-14 NOTE — EKG
Test Date:    2019-11-13               Test Time:    22:44:33

Technician:   JOSH                                    

                                                     

MEASUREMENT RESULTS:                                       

Intervals:                                           

Rate:         91                                     

WY:           226                                    

QRSD:         94                                     

QT:           368                                    

QTc:          452                                    

Axis:                                                

P:            62                                     

WY:           226                                    

QRS:          45                                     

T:            6                                      

                                                     

INTERPRETIVE STATEMENTS:                                       

                                                     

Sinus rhythm with 1st degree AV block

Nonspecific T wave abnormality

Abnormal ECG

Compared to ECG 12/11/2017 19:22:12

First degree AV block now present

T-wave abnormality now present

Atrial abnormality no longer present



Electronically Signed On 11-14-19 08:15:47 CST by Seth Mancuso

## 2019-11-14 NOTE — RAD REPORT
EXAM DESCRIPTION:  Rosemary Single View11/13/2019 9:33 pm

 

CLINICAL HISTORY:  Chest pain

 

COMPARISON:  2017

 

FINDINGS:   The lungs appear clear of acute infiltrate. The heart is normal size

 

IMPRESSION:   No acute abnormalities displayed

## 2019-11-14 NOTE — RAD REPORT
EXAM DESCRIPTION:  CT - Abdomen   Pelvis W Contrast - 11/14/2019 4:52 am

 

CLINICAL HISTORY:  ABD PAIN

 

COMPARISON:  None.

 

TECHNIQUE:  CT ABDOMEN PELVIS WITH IV CONTRAST on 11/13/2019 11:05 PM CST

This exam was performed according to our departmental dose-optimization program, which includes autom
ated exposure control, adjustment of the mA and/or kV according to patient size and/or use of iterati
ve reconstruction technique.

 

FINDINGS:  Lower lungs are clear.

Abdomen: There are several subcentimeter hypodense lesions in the liver. These are too small to deonte
cterize but likely represent cysts. There is no biliary dilatation. Gallbladder is normal in appearan
ce. The pancreas and spleen are normal in appearance. The adrenal glands and kidneys are unremarkable
.

Abdominal aorta is normal in course and caliber without aneurysm. There is no free air. There is no r
etroperitoneal adenopathy.

Pelvis: There is no bowel obstruction. Urinary bladder is unremarkable. There is no free fluid. Appen
saji is normal.

Skeleton: There are no acute osseous findings. No suspicious bony lesions.

 

IMPRESSION:  No acute inflammatory process. No renal or ureteral calculi.

 

Electronically signed by:   Vlad Mccollum MD   11/14/2019 12:20 AM CST Workstation: [id number]

 

 

Due to temporary technical issues with the PACS/Fluency reporting system, reports are being signed by
 the in house radiologist as a courtesy to ensure prompt reporting. The interpreting radiologist is f
ully responsible for the content of the report.

## 2019-11-15 VITALS — TEMPERATURE: 97.7 F

## 2019-11-15 VITALS — SYSTOLIC BLOOD PRESSURE: 146 MMHG | DIASTOLIC BLOOD PRESSURE: 82 MMHG

## 2019-11-15 LAB
BLD SMEAR INTERP: (no result)
BUN BLD-MCNC: 9 MG/DL (ref 7–18)
GIANT PLATELETS BLD QL SMEAR: (no result)
GLUCOSE SERPLBLD-MCNC: 123 MG/DL (ref 74–106)
HCT VFR BLD CALC: 41.1 % (ref 39.6–49)
INR BLD: 1.11
LYMPHOCYTES # SPEC AUTO: 0.8 K/UL (ref 0.7–4.9)
MAGNESIUM SERPL-MCNC: 2.3 MG/DL (ref 1.8–2.4)
MORPHOLOGY BLD-IMP: (no result)
PMV BLD: 11.9 FL (ref 7.6–11.3)
POTASSIUM SERPL-SCNC: 4 MMOL/L (ref 3.5–5.1)
RBC # BLD: 4.67 M/UL (ref 4.33–5.43)

## 2019-11-15 RX ADMIN — SODIUM CHLORIDE SCH MLS: 0.9 INJECTION, SOLUTION INTRAVENOUS at 04:39

## 2019-11-15 RX ADMIN — Medication SCH: at 09:00

## 2019-11-15 RX ADMIN — PANTOPRAZOLE SODIUM SCH MG: 40 TABLET, DELAYED RELEASE ORAL at 06:32

## 2019-11-16 NOTE — DS
Date of Discharge:  11/15/2019



Consultants:  Dr. Bennett and Dr. Phillips with Hematology/Oncology.



Admitting Diagnoses:  

1.Idiopathic thrombocytopenic purpura. 

2.Chronic use of steroids.

3.Chronic obstructive pulmonary disease.

4.Hepatitis C without coma.

5.Nicotine dependence with cigarette smoking, counseled.



Discharge Diagnoses:  

1.Acute on chronic idiopathic thrombocytopenic purpura, improved with IVIG and Decadron.

2.Chronic obstructive pulmonary disease, chronic bronchitis, stable.

3.Hepatitis C without hepatic coma.

4.Chronic use of steroids.

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

6.Nicotine dependence with cigarette smoking, counseled.



Hospital Course:  Patient is a 44-year-old male with past medical history of ITP, steroid dependent, 
HPV, hepatitis C, who comes in due to low platelet count and rectal bleed.  Patient was admitted for 
acute treatment.  He was started on IVIG and pulse dose Decadron.  He responded well.  His platelet c
ount increased from 17-30 and then to 51.  The IVIG was on back order.  He was only able to receive 1
 dose.  Patient was counseled regarding his smoking as well as following up with GI for his hepatitis
.  Patient has been counseled multiple times by his hematologist, has not followed up in the past.  KALPESH avalos needs to have workup and be treated for his hepatitis C, which will help his platelet count.  Tess
nt understands the importance of close followup.  Did speak with Dr. Cedeño, patient's GI doctor, chitra sykes, he had a family emergency, will be going out of town.  However, they recommended for ou
tpatient followup for EGD and further workup for his hepatitis C in terms of serology genotyping and 
treatment.  HIV screen was also done.  Previous HIV screen in 2017, was negative.  Patient has multip
le sexually transmitted disease including HPV and hepatitis.  He states the wife is aware.  They are 
currently not sexually active due to his hep C and HPV.  He understands that HPV causes cancer.  He n
eeds to have this evaluated by surgeon and once his platelet counts are acceptable and have him resec
aracely.  Overall, patient did well.  He responded well to Decadron and IVIG.  He was then cleared for colton ziegler from Hematology standpoint.



Followup:  He is to follow up with his primary care physician in 2-3 days follow up with GI, Dr. Walt nye in 1-2 weeks' follow up with hematologist Dr. Bennett in 1 week for lab draw.  Return to ER f
or worsening condition.



Diet:  Regular.



Activity:  As tolerated.



Medications:  As per medication reconciliation list.



Physical Examination:

General:  Awake, alert, and oriented x3.  No acute distress. 

CV:  S1, S2. 

Respiratory:  Moving air well bilaterally. 

Abdomen:  Abdomen is soft, nontender, nondistended.  Positive bowel sounds. 

Extremities:  No clubbing, cyanosis, or edema. 

Neurologic:  Nonfocal. 



Total time spent discharging the patient was 32 minutes.





/NANNETTE

DD:  11/15/2019 16:20:10Voice ID:  [id number]

DT:  11/16/2019 04:25:53Report ID:  [id number]

## 2019-12-05 ENCOUNTER — HOSPITAL ENCOUNTER (INPATIENT)
Dept: HOSPITAL 97 - ER | Age: 44
LOS: 3 days | Discharge: HOME | DRG: 194 | End: 2019-12-08
Attending: HOSPITALIST | Admitting: HOSPITALIST
Payer: COMMERCIAL

## 2019-12-05 VITALS — BODY MASS INDEX: 25.8 KG/M2

## 2019-12-05 DIAGNOSIS — J14: Primary | ICD-10-CM

## 2019-12-05 DIAGNOSIS — J44.1: ICD-10-CM

## 2019-12-05 DIAGNOSIS — B18.2: ICD-10-CM

## 2019-12-05 DIAGNOSIS — J44.0: ICD-10-CM

## 2019-12-05 DIAGNOSIS — F17.210: ICD-10-CM

## 2019-12-05 LAB
ANISOCYTOSIS BLD QL: (no result)
BLD SMEAR INTERP: (no result)
BUN BLD-MCNC: 15 MG/DL (ref 7–18)
GIANT PLATELETS BLD QL SMEAR: PRESENT
GLUCOSE SERPLBLD-MCNC: 119 MG/DL (ref 74–106)
HCT VFR BLD CALC: 43.9 % (ref 39.6–49)
LYMPHOCYTES # SPEC AUTO: 1.3 K/UL (ref 0.7–4.9)
MORPHOLOGY BLD-IMP: (no result)
PMV BLD: 12.4 FL (ref 7.6–11.3)
POTASSIUM SERPL-SCNC: 4.3 MMOL/L (ref 3.5–5.1)
RBC # BLD: 5.09 M/UL (ref 4.33–5.43)
TROPONIN (EMERG DEPT USE ONLY): < 0.02 NG/ML (ref 0–0.04)

## 2019-12-05 PROCEDURE — 84100 ASSAY OF PHOSPHORUS: CPT

## 2019-12-05 PROCEDURE — 87205 SMEAR GRAM STAIN: CPT

## 2019-12-05 PROCEDURE — 87184 SC STD DISK METHOD PER PLATE: CPT

## 2019-12-05 PROCEDURE — 94640 AIRWAY INHALATION TREATMENT: CPT

## 2019-12-05 PROCEDURE — 71045 X-RAY EXAM CHEST 1 VIEW: CPT

## 2019-12-05 PROCEDURE — 80048 BASIC METABOLIC PNL TOTAL CA: CPT

## 2019-12-05 PROCEDURE — 94760 N-INVAS EAR/PLS OXIMETRY 1: CPT

## 2019-12-05 PROCEDURE — 83605 ASSAY OF LACTIC ACID: CPT

## 2019-12-05 PROCEDURE — 84484 ASSAY OF TROPONIN QUANT: CPT

## 2019-12-05 PROCEDURE — 96375 TX/PRO/DX INJ NEW DRUG ADDON: CPT

## 2019-12-05 PROCEDURE — 85025 COMPLETE CBC W/AUTO DIFF WBC: CPT

## 2019-12-05 PROCEDURE — 93005 ELECTROCARDIOGRAM TRACING: CPT

## 2019-12-05 PROCEDURE — 83735 ASSAY OF MAGNESIUM: CPT

## 2019-12-05 PROCEDURE — 80053 COMPREHEN METABOLIC PANEL: CPT

## 2019-12-05 PROCEDURE — 36415 COLL VENOUS BLD VENIPUNCTURE: CPT

## 2019-12-05 PROCEDURE — 99285 EMERGENCY DEPT VISIT HI MDM: CPT

## 2019-12-05 PROCEDURE — 84145 PROCALCITONIN (PCT): CPT

## 2019-12-05 PROCEDURE — 82550 ASSAY OF CK (CPK): CPT

## 2019-12-05 PROCEDURE — 87070 CULTURE OTHR SPECIMN AEROBIC: CPT

## 2019-12-05 PROCEDURE — 81001 URINALYSIS AUTO W/SCOPE: CPT

## 2019-12-05 PROCEDURE — 87040 BLOOD CULTURE FOR BACTERIA: CPT

## 2019-12-05 PROCEDURE — 96367 TX/PROPH/DG ADDL SEQ IV INF: CPT

## 2019-12-05 PROCEDURE — 87804 INFLUENZA ASSAY W/OPTIC: CPT

## 2019-12-05 PROCEDURE — 71046 X-RAY EXAM CHEST 2 VIEWS: CPT

## 2019-12-05 PROCEDURE — 80061 LIPID PANEL: CPT

## 2019-12-05 PROCEDURE — 93306 TTE W/DOPPLER COMPLETE: CPT

## 2019-12-05 PROCEDURE — 96365 THER/PROPH/DIAG IV INF INIT: CPT

## 2019-12-05 RX ADMIN — SODIUM CHLORIDE SCH: 0.9 INJECTION, SOLUTION INTRAVENOUS at 22:00

## 2019-12-05 RX ADMIN — ALBUTEROL SULFATE PRN MG: 2.5 SOLUTION RESPIRATORY (INHALATION) at 21:29

## 2019-12-05 RX ADMIN — Medication SCH MG: at 21:02

## 2019-12-05 RX ADMIN — IPRATROPIUM BROMIDE PRN MG: 0.5 SOLUTION RESPIRATORY (INHALATION) at 15:44

## 2019-12-05 RX ADMIN — SODIUM CHLORIDE SCH MLS: 0.9 INJECTION, SOLUTION INTRAVENOUS at 13:13

## 2019-12-05 RX ADMIN — IPRATROPIUM BROMIDE PRN MG: 0.5 SOLUTION RESPIRATORY (INHALATION) at 21:29

## 2019-12-05 RX ADMIN — Medication SCH ML: at 22:11

## 2019-12-05 RX ADMIN — CEFTRIAXONE SCH MLS: 1 INJECTION, SOLUTION INTRAVENOUS at 21:02

## 2019-12-05 RX ADMIN — MOMETASONE FUROATE AND FORMOTEROL FUMARATE DIHYDRATE SCH PUFF: 100; 5 AEROSOL RESPIRATORY (INHALATION) at 22:10

## 2019-12-05 RX ADMIN — GUAIFENESIN SCH MG: 600 TABLET, EXTENDED RELEASE ORAL at 21:02

## 2019-12-05 RX ADMIN — Medication SCH: at 21:00

## 2019-12-05 RX ADMIN — ALBUTEROL SULFATE PRN MG: 2.5 SOLUTION RESPIRATORY (INHALATION) at 15:44

## 2019-12-05 NOTE — P.HP
Certification for Inpatient


Patient admitted to: Inpatient


With expected LOS: >2 Midnights


Patient will require the following post-hospital care: None


Practitioner: I am a practitioner with admitting privileges, knowledge of 

patient current condition, hospital course, and medical plan of care.


Services: Services provided to patient in accordance with Admission 

requirements found in Title 42 Section 412.3 of the Code of Federal Regulations





Patient History


Date of Service: 12/05/19


Primary Care Provider: Dr. Bolivar; Hematology-Dr. Bennett


Reason for admission: Bilateral pneumonia


History of Present Illness: 





44-year-old  male presented to the emergency room with cough, 

congestion and shortness of breath.  Patient with underlying hepatitis-C, 

chronic ITP, and chronic pain.





Over the last 3 days patient has been having increasing cough, congestion and 

shortness of breath.  Shortness of breath was worse.  His wife provided him 

albuterol without any significant change.  Room-air saturations at home range 

around 84. Patient with fever, chills.





In the ER patient was evaluated.  White count slightly elevated.  Platelet 

count 62.  BMP reviewed.  Pro calcitonin elevated.  Chest x-ray showed 

bilateral pneumonia left greater than right.  Patient admitted for further 

evaluation and treatment.





When I saw the patient in the hospital room, patient was appropriate and alert.

  Patient appeared stable.  Wife at bedside.  Patient did not look like in any 

respiratory distress.


Allergies





No Known Drug Allergies Allergy (Verified 11/13/19 10:46)


 Unknown


No Known Allergies Allergy (Uncoded 11/13/19 10:46)


 Unknown





Home medications list reviewed: Yes


Home Medications: 








Buprenorphine HCl/Naloxone HCl [Buprenorp-Nalox 8-2 mg Sl Film] 1 film SL TID 12 /05/19 








- Past Medical/Surgical History


Has patient received pneumonia vaccine in the past: No


Diabetic: No


-: Chronic ITP


-: Chronic herpes simplex virus


-: Hepatitis-C


-: Tobacco abuse


-: Rhinoplasty


-: Tonsillectomy with adenoidectomy


-: Appendectomy


Psychosocial/ Personal History: Patient is .





- Family History


  ** Father


-: Cancer (Lung cancer with mesothelioma)


Notes: lung cancer





  ** Mother


-: Heart disease





- Social History


Smoking Status: Heavy Tobacco smoker (>10 cigarettes/day)


Counseled patient to stop smoking for: less than 10 minutes


Smoking therapy provided: Yes


Patient receptive to therapy: Yes


Alcohol use: No


CD- Drugs: No


Caffeine use: Yes


Place of Residence: Home





Review of Systems


General: Fever, Chills, As per HPI


Eyes: Unremarkable


ENT: Nose Congestion, As per HPI


Respiratory: Cough, Shortness of Breath, SOB with Excertion, Wheezing, As per 

HPI


Cardiovascular: Unremarkable


Gastrointestinal: Unremarkable


Genitourinary: Unremarkable


Musculoskeletal: Unremarkable


Integumentary: Unremarkable


Neurological: Unremarkable


Lymphatics: Unremarkable





Physical Examination





- Vital Signs


Temperature: 98.5 F


Blood Pressure: 110/76


Pulse: 97


Respirations: 20





- Physical Exam


General: Alert, In no apparent distress, Oriented x3, Cooperative


HEENT: Atraumatic, Normocephalic, PERRLA, Mucous membr. moist/pink


Neck: Supple, No Thyromegaly


Respiratory: Crackles/rales (To the bases bilateral), Expiratory wheezes (Mild 

bilateral)


Cardiovascular: Normal pulses, Regular rate/rhythm


Gastrointestinal: Normal bowel sounds, Soft and benign, Non-distended, No 

tenderness, No masses, No rebound, No guarding


Musculoskeletal: No erythema, No tenderness, No warmth


Integumentary: No tenderness/swelling, No erythema, No warmth, No cyanosis


Neurological: Normal speech, Normal strength at 5/5 x4 extr, Normal tone, 

Normal affect





- Studies


Laboratory Data (last 24 hrs)





12/05/19 10:25: WBC 13.4 H, Hgb 14.8, Hct 43.9, Plt Count 62 L


12/05/19 09:49: Sodium 139, Potassium 4.3, BUN 15, Creatinine 1.24, Glucose 119 

H





Microbiology Data (last 24 hrs): 








12/05/19 09:38   Nasopharnyx   Influenza Type A Antigen Screen - Final


12/05/19 09:38   Nasopharnyx   Influenza Type B Antigen Screen - Final








Assessment and Plan





- Plan





Impression:


Fever, chills, shortness of breath secondary to bilateral pneumonia and likely 

with underlying COPD


Chronic ITP


Chronic hepatitis-C


Chronic pain


Tobacco abuse





Plan:


Fever, chills, shortness of breath secondary to bilateral pneumonia and likely 

with underlying COPD:  Patient has been admitted.  Will continue with Rocephin 

and Zithromax.  Will provide medication for cough and congestion.  Will recheck 

chest x-ray tomorrow.  Will continue to wean off oxygen.  Maintain sats above 93

%.  Will start COPD medication-Dulera.  Patient will likely require COPD 

medication at discharge.  Will consult pulmonology to further evaluate.  Will 

monitor pro calcitonin.  Blood, sputum cultures to be obtained.  Continue IV 

fluids.  Will monitor and adjust appropriately.


Chronic ITP:  Patient with history of chronic ITP.  Case discussed with 

hematology.  Patient on a prednisone for COPD.  Will provide SCD for DVT 

prophylaxis.  Monitor platelet count.  If platelet count decreases below 20,000 

will need to Re discuss with hematology.


Chronic hepatitis-C:  Overall stable.  This is to be addressed as an outpatient 

by GI.


Chronic pain:  Patient is seen by pain management.  Will continue with his 

medication.


Tobacco abuse:  Will provide nicotine patch.  Patient is eager to quit smoking.

  Education provided.


Discharge Plan: Home


Plan to discharge in: 72 Hours





- Advance Directives


Does patient have a Living Will: No


Does patient have a Durable POA for Healthcare: Yes





- Code Status/Comfort Care


Code Status Assessed: Yes (Patient is full code)


Time Spent Managing Pts Care (In Minutes): 55

## 2019-12-05 NOTE — XMS REPORT
[facility]

 Created on:2018



Patient:Lisandro Good

Sex:Male

:1975

External Reference #:[id number]





Demographics







 Address  [address] [place] [address]



   [place] [address]

 

 Phone  [phone]

 

 Preferred Language  en

 

 Marital Status  Unknown

 

 Jew Affiliation  Unknown

 

 Race  Other Race

 

 Ethnic Group  Not  or 









Author







 Organization  [employer]









Care Team Providers







 Name  Role  Phone

 

 John Benítez  Provider Role  Unavailable









Allergies, Adverse Reactions, Alerts







 Substance  Reaction  Event Type

 

 N.K.D.A.  Info Not Available  Non Drug Allergy







Problems







 Problem Type  Condition  Code  Onset Dates  Condition Status

 

 Assessment  Depression with anxiety  F41.8    Active

 

 Assessment  Tooth pain  K08.89    Active

 

 Assessment  Chronic ITP (idiopathic  D69.3    Active



   thrombocytopenia)      

 

 Problem  Depression with anxiety  F41.8    Active

 

 Problem  Condyloma acuminata  A63.0    Active

 

 Problem  Thrombocytopenia  D69.6    Active

 

 Problem  Migraine without aura and without  G43.009    Active



   status migrainosus, not intractable      

 

 Problem  Chronic ITP (idiopathic  D69.3    Active



   thrombocytopenia)      

 

 Problem  Tobacco use disorder  F17.200    Active

 

 Problem  Osteoarthritis of multiple joints  M15.9    Active

 

 Assessment  Condyloma acuminata  A63.0    Active

 

 Assessment  Migraine without aura and without  G43.009    Active



   status migrainosus, not intractable      

 

 Assessment  Tobacco use disorder  F17.200    Active







Medications







 Medication  Code System  Code  Instructions  Start  End Date  Status  Dosage



         Date      

 

 Ibuprofen  NDC  [id number]  400 MG Orally      Active  1 tablet



       Three times a day        with food



               or milk as



               needed

 

 Norco  NDC  [id number]  5-325 MG Orally      Active  1 tablet



       every 6 hrs        as needed







Results

No Known Results



Summary Purpose

[employer] Submission

## 2019-12-05 NOTE — RAD REPORT
EXAM DESCRIPTION:  Rosemary Single View12/5/2019 9:59 am

 

CLINICAL HISTORY:  cough

 

COMPARISON:  2017

 

FINDINGS:  Patchy bilateral pulmonary opacities are present left greater than right

 

The heart is normal size

 

IMPRESSION:  Patchy bilateral pulmonary opacities left greater than right likely pneumonia

## 2019-12-05 NOTE — EDPHYS
Physician Documentation                                                                           

 [facility]                                                                 

Name: Lisandro Good                                                                                

Age: 44 yrs                                                                                       

Sex: Male                                                                                         

: 1975                                                                                   

MRN: [id number]                                                                                   

Arrival Date: 2019                                                                          

Time: 09:26                                                                                       

Account#: [id number]                                                                            

Bed 4                                                                                             

Private MD: Osiel Godwin; Leona Gillette                                                

ED Physician Mahendra Benoit                                                                         

HPI:                                                                                              

                                                                                             

09:35 This 44 yrs old  Male presents to ER via Un[facility]igned with complaints of         rn  

      Shortness Of Breath.                                                                        

09:35 The patient has shortness of breath at rest, with light activity. Onset: The            rn  

      symptoms/episode began/occurred 3 day(s) ago. Duration: The symptoms are continuous.        

      The patient's shortness of breath is aggravated by exertion, light activity. Severity       

      of symptoms: At their worst the symptoms were moderate in the emergency department the      

      symptoms are unchanged. The patient has not experienced similar symptoms in the past.       

      The patient has not recently seen a physician. Reports fever/cough/dyspnea, with oxygen     

      down to 82% with ambulation, + smoker, feels worse today. Been taking breathing             

      treatments at home with only a little help. + pleuritic left sided pain. No                 

      vomiting/diarrhea. + known ITP..                                                            

                                                                                                  

Historical:                                                                                       

- Allergies:                                                                                      

09:36 No Known Drug Allergies;                                                                ph  

- Home Meds:                                                                                      

09:36 Prednisone Oral [Active]; Suboxone [Active];                                            ph  

- PMHx:                                                                                           

09:36 Hepatitis; C; ITP;                                                                      ph  

- PSHx:                                                                                           

09:36 Appendectomy;                                                                           ph  

                                                                                                  

- Immunization history:: Adult Immunizations unknown.                                             

- Ebola Screening: : No symptoms or risks identified at this time.                                

- Family history:: not pertinent.                                                                 

- Hospitalizations: : No recent hospitalization is reported.                                      

                                                                                                  

                                                                                                  

ROS:                                                                                              

09:35 Constitutional: + fever and chills Eyes: Negative for injury, pain, redness, and        rn  

      discharge, ENT: Negative for injury, pain, and discharge, Neck: Negative for injury,        

      and swelling, Cardiovascular: + left sided chest pain Respiratory: + sob and cough, +       

      left pleuritic chest pain Abdomen/GI: Negative for abdominal pain, nausea, vomiting,        

      diarrhea, and constipation, MS/Extremity: Negative for injury and deformity, Skin:          

      Negative for injury, rash, and discoloration, Neuro: + generalized weakness                 

                                                                                                  

Exam:                                                                                             

09:35 Constitutional:  This is a well developed, well nourished patient who is awake, alert,  rn  

      + moderate respiratory distress Head/Face:  Normocephalic, atraumatic. Eyes:  Pupils        

      equal round and reactive to light, extra-ocular motions intact.  Lids and lashes            

      normal.  Conjunctiva and sclera are non-icteric and not injected.  Cornea within normal     

      limits.  Periorbital areas with no swelling, redness, or edema. ENT:  dry MM without        

      stridor Neck:  Trachea midline, no thyromegaly or masses palpated, and no cervical          

      lymphadenopathy.  Supple, full range of motion without nuchal rigidity, or vertebral        

      point tenderness.  No Meningismus. Cardiovascular:  tachycardic, regular, no murmur         

      Respiratory:  + moderate tachypnea with coarse bilateral breath sounds, worse on left       

      base. + faint exp wheezing, speaking 3 word sentences Abdomen/GI:  soft, non-tender MS/     

      Extremity:  Pulses equal, no cyanosis.  Neurovascular intact.  Full, normal range of        

      motion.  Equal circumference. Neuro:  Awake and alert, GCS 15                               

                                                                                                  

Vital Signs:                                                                                      

09:35  / 67; Pulse 103; Resp 28; Temp 98.5(O); Pulse Ox 87% on R/A; Weight 81.65 kg;    ph  

      Height 5 ft. 10 in. (177.80 cm);                                                            

10:40  / 73; Pulse 104; Resp 23; Pulse Ox 96% on 2 lpm NC;                              ph  

11:21  / 77; Pulse 101; Resp 22; Pulse Ox 93% on 2 lpm NC;                              ph  

12:06  / 76; Pulse 97; Resp 20; Temp 98.5(O); Pulse Ox 95% on 2 lpm NC;                 ph  

09:35 Body Mass Index 25.83 (81.65 kg, 177.80 cm)                                             ph  

                                                                                                  

MDM:                                                                                              

09:29 Patient medically screened.                                                             rn  

10:46 Differential diagnosis: Bronchitis Chronic Obstructive Pulmonary Disease Myocardial     rn  

      Infarction pneumonia, Pneumothorax pulmonary edema, reactive airway disease, Sepsis.        

      Data reviewed: vital signs, nurses notes, lab test result(s), EKG, radiologic studies,      

      plain films, and as a result, I will admit patient. Test interpretation: by ED              

      physician or midlevel provider: CXR with bilateral opacities indicating pneumonia.          

      Counseling: I had a detailed discussion with the patient and/or guardian regarding: the     

      historical points, exam findings, and any diagnostic results supporting the                 

      discharge/admit diagnosis, lab results, radiology results, the need for further work-up     

      and treatment in the hospital. Response to treatment: the patient's symptoms have           

      mildly improved after treatment, and as a result, I will admit patient. Admission           

      orders: after a detailed discussion of the patient's condition and case, the admit          

      orders are written by me. ED course: Admitted to Dr. Stein for bilateral pneumonia. .        

                                                                                                  

                                                                                             

09:35 Order name: Basic Metabolic Panel; Complete Time: 10:44                                 rn  

                                                                                             

09:35 Order name: Blood Culture Adult (2)                                                     rn  

                                                                                             

09:35 Order name: CBC with Diff                                                               rn  

                                                                                             

09:35 Order name: CPK; Complete Time: 10:44                                                   rn  

                                                                                             

09:35 Order name: Lactate; Complete Time: 10:44                                               rn  

                                                                                             

09:35 Order name: Procalcitonin; Complete Time: 10:44                                         rn  

                                                                                             

09:35 Order name: Troponin (emerg Dept Use Only); Complete Time: 10:44                        rn  

                                                                                             

09:35 Order name: Flu; Complete Time: 10:44                                                   rn  

                                                                                             

10:39 Order name: Sputum Culture                                                              ph  

                                                                                             

11:49 Order name: CBC with Automated Diff                                                     EDMS

                                                                                             

11:49 Order name: CBC with Automated Diff                                                     EDMS

                                                                                             

11:49 Order name: Comprehensive Metabolic Panel                                               EDMS

                                                                                             

11:49 Order name: Comprehensive Metabolic Panel                                               EDMS

                                                                                             

11:49 Order name: Lactate                                                                     EDMS

                                                                                             

09:35 Order name: Chest Single View XRAY; Complete Time: 10:44                                rn  

                                                                                             

11:21 Order name: EKG Electrocardiogram                                                       EDMS

                                                                                             

11:49 Order name: Lactate                                                                     EDMS

                                                                                             

11:49 Order name: Lipid Profile                                                               EDMS

                                                                                             

11:49 Order name: Lipid Profile                                                               EDMS

                                                                                             

11:49 Order name: Magnesium                                                                   EDMS

                                                                                             

11:49 Order name: Magnesium                                                                   EDMS

                                                                                             

11:49 Order name: Procalcitonin                                                               EDMS

                                                                                             

11:49 Order name: Procalcitonin                                                               EDMS

                                                                                             

11:49 Order name: Phosphorus                                                                  EDMS

                                                                                             

11:49 Order name: Phosphorus                                                                  EDMS

                                                                                             

09:35 Order name: Accucheck; Complete Time: 09:46                                             rn  

                                                                                             

09:35 Order name: Cardiac monitoring; Complete Time: 09:41                                    rn  

                                                                                             

09:35 Order name: EKG - Nurse/Tech; Complete Time: 09:46                                      rn  

                                                                                             

09:35 Order name: IV Saline Lock - Large Bore; Complete Time: 09:46                           rn  

                                                                                             

09:35 Order name: Labs collected and sent; Complete Time: 09:46                               rn  

                                                                                             

09:35 Order name: O2 Per Protocol; Complete Time: 09:41                                       rn  

                                                                                             

09:35 Order name: O2 Sat Monitoring; Complete Time: 09:41                                     rn  

                                                                                             

11:49 Order name: Regular                                                                     EDMS

                                                                                                  

Administered Medications:                                                                         

09:53 Drug: SOLU-Medrol 125 mg Route: IVP; Site: right antecubital;                           ph  

10:39 Follow up: Response: No adverse reaction                                                ph  

09:53 Drug: Xopenex (3) 1.25 mg Route: Inhalation;                                            ph  

10:40 Follow up: Response: No adverse reaction                                                ph  

09:56 Drug: NS 0.9% (30 ml/kg) 30 ml/kg Route: IV; Rate: bolus; Site: right antecubital;      ph  

12:07 Follow up: Response: No adverse reaction; IV Status: Completed infusion; IV Intake:     ph  

      2500ml                                                                                      

10:20 Drug: Rocephin - (cefTRIAXone) 1 grams Route: IVPB; Infused Over: 30 mins; Site: right  ph  

      antecubital;                                                                                

10:40 Follow up: Response: No adverse reaction; IV Status: Completed infusion                 ph  

10:35 Drug: LevaQUIN 750 mg Volume: 150 ml; Route: IVPB; Infused Over: 90 mins; Site: right   ph  

      antecubital;                                                                                

12:07 Follow up: Response: No adverse reaction; IV Status: Completed infusion                 ph  

                                                                                                  

                                                                                                  

Disposition:                                                                                      

19 10:49 Hospitalization ordered by Keiko Stein for Inpatient Admission. Preliminary     

  diagnosis are Bilateral Pneumonia, Hypoxemia, Sepsis, unspecified organism.                     

- Bed requested for Telemetry/MedSurg (Inpatient).                                                

- Status is Inpatient Admission.                                                              ph  

- Condition is Stable.                                                                            

- Problem is new.                                                                                 

- Symptoms have improved.                                                                         

UTI on Admission? No                                                                              

                                                                                                  

                                                                                                  

                                                                                                  

Signatures:                                                                                       

Dispatcher [employer]                           EDMansi Scherer RN RN dw Nieto, Roman, MD MD rn Hall, Patricia, RN RN   ph                                                   

                                                                                                  

Corrections: (The following items were deleted from the chart)                                    

10:49 10:49 Hospitalization Ordered by Keiko Stein MD for Inpatient Admission. Preliminary  rn  

      diagnosis is Bilateral Pneumonia; Hypoxemia. Bed requested for Telemetry/MedSurg            

      (Inpatient). Status is Inpatient Admission. Condition is Stable. Problem is new.            

      Symptoms have improved. UTI on Admission? No. rn                                            

12:04 10:49 2019 10:49 Hospitalization Ordered by Keiko Stein MD for Inpatient        dw  

      Admission. Preliminary diagnosis is Bilateral Pneumonia; Hypoxemia; Sepsis, unspecified     

      organism. Bed requested for Telemetry/MedSurg (Inpatient). Status is Inpatient              

      Admission. Condition is Stable. Problem is new. Symptoms have improved. UTI on              

      Admission? No. rn                                                                           

12:52 12:04 2019 10:49 Hospitalization Ordered by Keiko Stein MD for Inpatient        ph  

      Admission. Preliminary diagnosis is Bilateral Pneumonia; Hypoxemia; Sepsis, unspecified     

      organism. Bed requested for Telemetry/MedSurg (Inpatient). Status is Inpatient              

      Admission. Condition is Stable. Problem is new. Symptoms have improved. UTI on              

      Admission? No. dw                                                                           

                                                                                                  

**************************************************************************************************

## 2019-12-05 NOTE — P.HP
Certification for Inpatient


Patient admitted to: Inpatient


With expected LOS: >2 Midnights


Patient will require the following post-hospital care: None


Practitioner: I am a practitioner with admitting privileges, knowledge of 

patient current condition, hospital course, and medical plan of care.


Services: Services provided to patient in accordance with Admission 

requirements found in Title 42 Section 412.3 of the Code of Federal Regulations





Patient History


Date of Service: 12/05/19


Reason for admission: BIlateral pneumonia


Allergies





No Known Drug Allergies Allergy (Verified 11/13/19 10:46)


 Unknown


No Known Allergies Allergy (Uncoded 11/13/19 10:46)


 Unknown





Home Medications: 








predniSONE [Prednisone*] 70 mg PO DAILY 12/12/17 


dexAMETHasone [Dexamethasone] 40 mg PO DAILY #20 tablet 11/15/19 








- Past Medical/Surgical History


Diabetic: No


-: ITP


-: HPV


-: HEPATITIS C


-: Rhinoplasty


-: tonsillectomy


-: adenoids removed


-: appendectomy





- Family History


  ** Father


Medical History: Cancer


Notes: lung cancer





- Social History


Alcohol use: No


CD- Drugs: Yes


Caffeine use: Yes





Physical Examination





- Studies


Laboratory Data (last 24 hrs)





12/05/19 10:25: WBC 13.4 H, Hgb 14.8, Hct 43.9, Plt Count 62 L


12/05/19 09:49: Sodium 139, Potassium 4.3, BUN 15, Creatinine 1.24, Glucose 119 

H





Microbiology Data (last 24 hrs): 








12/05/19 09:38   Nasopharnyx   Influenza Type A Antigen Screen - Final


12/05/19 09:38   Nasopharnyx   Influenza Type B Antigen Screen - Final








Assessment & Plan





- Advance Directives


Does patient have a Living Will: No


Does patient have a Durable POA for Healthcare: No

## 2019-12-05 NOTE — XMS REPORT
[facility]

 Created on:2018



Patient:Lisandro Good

Sex:Male

:1975

External Reference #:[id number]





Demographics







 Address  [address] [place] [id number]

 

 Phone  [phone]

 

 Preferred Language  en

 

 Marital Status  Unknown

 

 Alevism Affiliation  Unknown

 

 Race  Other Race

 

 Ethnic Group  Not  or 









Author







 Organization  [employer]









Care Team Providers







 Name  Role  Phone

 

 John Benítez  Provider Role  Unavailable









Allergies

No Known Allergies



Problems







 Problem Type  Condition  Code  Onset Dates  Condition Status

 

 Problem  Depression with anxiety  F41.8    Active

 

 Problem  Condyloma acuminata  A63.0    Active

 

 Problem  Thrombocytopenia  D69.6    Active

 

 Problem  Migraine without aura and without  G43.009    Active



   status migrainosus, not intractable      

 

 Problem  Chronic ITP (idiopathic  D69.3    Active



   thrombocytopenia)      

 

 Problem  Tobacco use disorder  F17.200    Active

 

 Problem  Osteoarthritis of multiple joints  M15.9    Active







Medications

No Known Medications



Results

No Known Results



Summary Purpose

[employer] Submission

## 2019-12-05 NOTE — EKG
Test Date:    2019-12-05               Test Time:    09:39:46

Technician:   GUI                                     

                                                     

MEASUREMENT RESULTS:                                       

Intervals:                                           

Rate:         96                                     

DE:           144                                    

QRSD:         84                                     

QT:           426                                    

QTc:          538                                    

Axis:                                                

P:            66                                     

DE:           144                                    

QRS:          36                                     

T:            33                                     

                                                     

INTERPRETIVE STATEMENTS:                                       

                                                     

Normal sinus rhythm with sinus arrhythmia

Right atrial enlargement

Prolonged QT

Abnormal ECG

Compared to ECG 11/13/2019 22:44:33

Atrial abnormality now present

Prolonged QT interval now present

First degree AV block no longer present

T-wave abnormality no longer present



Electronically Signed On 12-05-19 22:55:27 CST by Uriel Hanks

## 2019-12-05 NOTE — ER
Nurse's Notes                                                                                     

 [facility]                                                                 

Name: Lisandro Good                                                                                

Age: 44 yrs                                                                                       

Sex: Male                                                                                         

: 1975                                                                                   

MRN: [id number]                                                                                   

Arrival Date: 2019                                                                          

Time: 09:26                                                                                       

Account#: [id number]                                                                            

Bed 4                                                                                             

Private MD: Osiel Godwin; Leona Gillette                                                

Diagnosis: Bilateral Pneumonia;Hypoxemia;Sepsis, unspecified organism                             

                                                                                                  

Presentation:                                                                                     

                                                                                             

09:34 Acuity: LADY 2                                                                           ss  

09:37 Presenting complaint: Wife states: Productive cough, SOB, chills x 3 days, states that  ph  

      SPO2 decreases to 83% when ambulating. Transition of care: patient was not received         

      from another setting of care. Onset of symptoms was 2019. Risk Assessment:     

      Do you want to hurt yourself or someone else? Patient reports no desire to harm self or     

      others. Initial Sepsis Screen: Does the patient meet any 2 criteria? RR > 20 per min.       

      HR > 90 bpm. Does the patient have a suspected source of infection? Yes: Productive         

      cough/pneumonia. Care prior to arrival: None.                                               

09:37 Method Of Arrival: Wheelchair                                                           ph  

                                                                                                  

Historical:                                                                                       

- Allergies:                                                                                      

09:36 No Known Drug Allergies;                                                                ph  

- Home Meds:                                                                                      

09:36 Prednisone Oral [Active]; Suboxone [Active];                                            ph  

- PMHx:                                                                                           

09:36 Hepatitis; C; ITP;                                                                      ph  

- PSHx:                                                                                           

09:36 Appendectomy;                                                                           ph  

                                                                                                  

- Immunization history:: Adult Immunizations unknown.                                             

- Ebola Screening: : No symptoms or risks identified at this time.                                

- Family history:: not pertinent.                                                                 

- Hospitalizations: : No recent hospitalization is reported.                                      

                                                                                                  

                                                                                                  

Screenin:36 Abuse screen: Denies threats or abuse. Denies injuries from another. Nutritional        ph  

      screening: No deficits noted. Tuberculosis screening: No symptoms or risk factors           

      identified. Fall Risk None identified.                                                      

                                                                                                  

Assessment:                                                                                       

09:34 Reassessment: Code Sepsis ER room 4.                                                    ss  

09:39 General: Appears in no apparent distress. uncomfortable, slender, well groomed,         ph  

      Behavior is cooperative, appropriate for age, anxious, Reports chills for 1-2 days.         

      Pain: Complains of pain in chest. Neuro: Level of Consciousness is awake, alert, obeys      

      commands, Oriented to person, place, time, situation. Cardiovascular: Reports chest         

      pain, fatigue, lightheadedness, shortness of breath, Denies nausea, vomiting, Rhythm is     

      sinus rhythm. Respiratory: Reports shortness of breath at rest on exertion cough that       

      is labored breathing Airway is patent Respiratory effort is labored, Respiratory            

      pattern is tachypnea Breath sounds are coarse bilaterally. Breath sounds are diminished     

      in left posterior upper lobe and left posterior lower lobe. GI: No signs and/or             

      symptoms were reported involving the gastrointestinal system. Derm: Skin is intact,         

      Skin is pink, warm \T\ dry. Musculoskeletal: Circulation, motion, and sensation intact.     

      Range of motion: intact in all extremities.                                                 

10:41 Reassessment: Patient appears in no apparent distress at this time. Patient and/or      ph  

      family updated on plan of care and expected duration. Pain level reassessed. Pt resting     

      comfortably, respirations decreased to 20 down from 28, Spo2 maintained at > 95% on 2 L     

      NC.                                                                                         

12:04 Reassessment: Patient appears in no apparent distress at this time. Patient and/or      ph  

      family updated on plan of care and expected duration. Pain level reassessed. Patient is     

      alert, oriented x 3, equal unlabored respirations, skin warm/dry/pink.                      

                                                                                                  

Vital Signs:                                                                                      

09:35  / 67; Pulse 103; Resp 28; Temp 98.5(O); Pulse Ox 87% on R/A; Weight 81.65 kg;    ph  

      Height 5 ft. 10 in. (177.80 cm);                                                            

10:40  / 73; Pulse 104; Resp 23; Pulse Ox 96% on 2 lpm NC;                              ph  

11:21  / 77; Pulse 101; Resp 22; Pulse Ox 93% on 2 lpm NC;                              ph  

12:06  / 76; Pulse 97; Resp 20; Temp 98.5(O); Pulse Ox 95% on 2 lpm NC;                 ph  

09:35 Body Mass Index 25.83 (81.65 kg, 177.80 cm)                                             ph  

                                                                                                  

ED Course:                                                                                        

09:26 Patient arrived in ED.                                                                  as  

09:26 Osiel Godwin MD is Private Physician.                                                 as  

09:26 Leona Gillette MD is Private Physician.                                       as  

09:29 Mahendra Benoit MD is Attending Physician.                                                rn  

09:34 Triage completed.                                                                       ss  

09:38 Arm band placed on Patient placed in an exam room, on a stretcher, on oxygen, on        ph  

      cardiac monitor, on pulse oximetry.                                                         

09:38 Patient has correct armband on for positive identification. Placed in gown. Bed in low  ph  

      position. Call light in reach. Side rails up X 1. Cardiac monitor on. Pulse ox on. NIBP     

      on. Door closed. Noise minimized. Warm blanket given. Head of bed elevated.                 

09:40 Camila Garcia RN is Primary Nurse.                                                    ph  

09:45 Inserted saline lock: 20 gauge in right antecubital area, using aseptic technique.      bp  

      Blood collected.                                                                            

09:47 EKG done, by EKG tech. reviewed by Mahendra Benoit MD.                                      at1 

10:02 Chest Single View XRAY In Process Unspecified.                                          EDMS

10:48 Keiko Stein MD is Hospitalizing Provider.                                           rn  

11:49 Repeat lab(s) drawn. by me, sent to lab.                                                3 

12:06 No provider procedures requiring assistance completed. Patient admitted, IV remains in  ph  

      place.                                                                                      

                                                                                                  

Administered Medications:                                                                         

09:53 Drug: SOLU-Medrol 125 mg Route: IVP; Site: right antecubital;                           ph  

10:39 Follow up: Response: No adverse reaction                                                ph  

09:53 Drug: Xopenex (3) 1.25 mg Route: Inhalation;                                            ph  

10:40 Follow up: Response: No adverse reaction                                                ph  

09:56 Drug: NS 0.9% (30 ml/kg) 30 ml/kg Route: IV; Rate: bolus; Site: right antecubital;      ph  

12:07 Follow up: Response: No adverse reaction; IV Status: Completed infusion; IV Intake:     ph  

      2500ml                                                                                      

10:20 Drug: Rocephin - (cefTRIAXone) 1 grams Route: IVPB; Infused Over: 30 mins; Site: right  ph  

      antecubital;                                                                                

10:40 Follow up: Response: No adverse reaction; IV Status: Completed infusion                 ph  

10:35 Drug: LevaQUIN 750 mg Volume: 150 ml; Route: IVPB; Infused Over: 90 mins; Site: right   ph  

      antecubital;                                                                                

12:07 Follow up: Response: No adverse reaction; IV Status: Completed infusion                 ph  

                                                                                                  

                                                                                                  

Intake:                                                                                           

12:07 IV: 2500ml; Total: 2500ml.                                                              ph  

                                                                                                  

Outcome:                                                                                          

10:49 Decision to Hospitalize by Provider.                                                    rn  

12:52 Patient left the ED.                                                                    ph  

12:52 Admitted to Med/surg accompanied by tech, via wheelchair, with oxygen, with chart,      ph  

      Report called to  Caro BHATT                                                                

12:52 Condition: stable                                                                           

12:52 Instructed on the need for admit.                                                           

                                                                                                  

Signatures:                                                                                       

Dispatcher [employer]                           Riri Farnsworth Roman, MD MD rn                                                   

[facility] Center[facility] RN                      RN                                                      

Erin Alvarez, EKG Tech              EK Tat1                                                  

Camila Garcia RN                      RN                                                      

Katie Ramirez                              3                                                  

Lee Matamoros RN                      RN   bp                                                   

                                                                                                  

**************************************************************************************************

## 2019-12-05 NOTE — XMS REPORT
Patient Summary Document

 Created on:2019



Patient:SEA MARSH

Sex:Male

:1975

External Reference #:[id number]





Demographics







 Address  [address]



   [place] [address]

 

 Home Phone  [phone]

 

 Work Phone  [phone]

 

 Preferred Language  Unknown

 

 Marital Status  Unknown

 

 Jainism Affiliation  Unknown

 

 Race  Unknown

 

 Additional Race(s)  Unavailable

 

 Ethnic Group  Unknown









Author







 [facility]  [facility]

 

 Address  [address] [place] [address] [address] [address]

 

 Phone  [phone]









Support







 Name  Relationship  Address  Phone

 

 REDD MARSH  Unavailable  [address] [phone]



     [place] [address]  

 

 REDD MARSH  Unavailable  [address] [phone]



     [place] [address]  









Care Team Providers







 Name  Role  Phone

 

 Unavailable  Unavailable  Unavailable









Payers







 Payer Name  Policy Type  Policy Number  Effective Date  Expiration Date







Problems

This patient has no known problems.



Allergies, Adverse Reactions, Alerts

This patient has no known allergies or adverse reactions.



Medications

This patient has no known medications.

## 2019-12-06 LAB
ALBUMIN SERPL BCP-MCNC: 2.4 G/DL (ref 3.4–5)
ALP SERPL-CCNC: 83 U/L (ref 45–117)
ALT SERPL W P-5'-P-CCNC: 17 U/L (ref 12–78)
AST SERPL W P-5'-P-CCNC: 24 U/L (ref 15–37)
BUN BLD-MCNC: 11 MG/DL (ref 7–18)
GLUCOSE SERPLBLD-MCNC: 128 MG/DL (ref 74–106)
HCT VFR BLD CALC: 39.2 % (ref 39.6–49)
HDLC SERPL-MCNC: 55 MG/DL (ref 40–60)
LDLC SERPL CALC-MCNC: 129 MG/DL (ref ?–130)
LYMPHOCYTES # SPEC AUTO: 1 K/UL (ref 0.7–4.9)
MAGNESIUM SERPL-MCNC: 2.2 MG/DL (ref 1.8–2.4)
PMV BLD: 12.6 FL (ref 7.6–11.3)
POTASSIUM SERPL-SCNC: 4.1 MMOL/L (ref 3.5–5.1)
RBC # BLD: 4.55 M/UL (ref 4.33–5.43)
UA COMPLETE W REFLEX CULTURE PNL UR: (no result)

## 2019-12-06 RX ADMIN — NICOTINE SCH MG: 21 PATCH TRANSDERMAL at 09:12

## 2019-12-06 RX ADMIN — GUAIFENESIN SCH MG: 600 TABLET, EXTENDED RELEASE ORAL at 09:13

## 2019-12-06 RX ADMIN — Medication SCH MG: at 09:12

## 2019-12-06 RX ADMIN — SODIUM CHLORIDE SCH MLS: 0.9 INJECTION, SOLUTION INTRAVENOUS at 09:13

## 2019-12-06 RX ADMIN — CEFTRIAXONE SCH MLS: 1 INJECTION, SOLUTION INTRAVENOUS at 20:52

## 2019-12-06 RX ADMIN — MOMETASONE FUROATE AND FORMOTEROL FUMARATE DIHYDRATE SCH PUFF: 100; 5 AEROSOL RESPIRATORY (INHALATION) at 09:14

## 2019-12-06 RX ADMIN — Medication SCH: at 20:50

## 2019-12-06 RX ADMIN — MOMETASONE FUROATE AND FORMOTEROL FUMARATE DIHYDRATE SCH PUFF: 100; 5 AEROSOL RESPIRATORY (INHALATION) at 20:50

## 2019-12-06 RX ADMIN — ALBUTEROL SULFATE PRN MG: 2.5 SOLUTION RESPIRATORY (INHALATION) at 13:45

## 2019-12-06 RX ADMIN — Medication SCH: at 13:30

## 2019-12-06 RX ADMIN — Medication SCH MG: at 20:51

## 2019-12-06 RX ADMIN — IPRATROPIUM BROMIDE SCH MG: 0.5 SOLUTION RESPIRATORY (INHALATION) at 13:45

## 2019-12-06 RX ADMIN — Medication SCH ML: at 09:14

## 2019-12-06 RX ADMIN — ALBUTEROL SULFATE PRN MG: 2.5 SOLUTION RESPIRATORY (INHALATION) at 08:25

## 2019-12-06 RX ADMIN — Medication SCH: at 09:00

## 2019-12-06 RX ADMIN — CEFTRIAXONE SCH MLS: 1 INJECTION, SOLUTION INTRAVENOUS at 09:12

## 2019-12-06 RX ADMIN — ALBUTEROL SULFATE PRN MG: 2.5 SOLUTION RESPIRATORY (INHALATION) at 20:18

## 2019-12-06 RX ADMIN — IPRATROPIUM BROMIDE SCH MG: 0.5 SOLUTION RESPIRATORY (INHALATION) at 20:00

## 2019-12-06 RX ADMIN — IPRATROPIUM BROMIDE PRN MG: 0.5 SOLUTION RESPIRATORY (INHALATION) at 08:25

## 2019-12-06 RX ADMIN — Medication SCH ML: at 20:51

## 2019-12-06 NOTE — P.PN
Subjective


Date of Service: 12/06/19


Primary Care Provider: Dr. Bolivar; Hematology-Dr. Bennett


Chief Complaint: Bilateral pneumonia


Subjective: No new changes (still c/o of cough , chest tightness ,  -still feel 

congested)





Review of Systems


Respiratory: Cough, Shortness of Breath, SOB with Excertion


Cardiovascular: Orthopnea





Physical Examination





- Vital Signs


Temperature: 97.2 F


Blood Pressure: 124/77


Pulse: 66


Respirations: 22


Pulse Ox (%): 92





- Physical Exam


General: Alert, In no apparent distress


HEENT: Atraumatic, Normocephalic, PERRLA


Neck: Supple, 2+ carotid pulse no bruit


Respiratory: Crackles/rales (on left base with occ exp wheeze)


Cardiovascular: No edema, Normal pulses, Regular rate/rhythm, Normal S1 S2


Gastrointestinal: Normal bowel sounds, No tenderness


Musculoskeletal: No clubbing, No swelling


Neurological: Normal gait, Normal speech, Normal strength at 5/5 x4 extr





- Studies


Laboratory Data (last 24 hrs)





12/05/19 10:25: WBC 13.4 H, Hgb 14.8, Hct 43.9, Plt Count 62 L








12/05/19 10:25: WBC 13.4 H, RBC 5.09, Hgb 14.8, Hct 43.9, MCV 86.4, MCH 29.2, 

MCHC 33.8, RDW 14.7, Plt Count 62 L, MPV 12.4 H, Neutrophils % 84.8 H, 

Lymphocytes % 9.9 L, Monocytes % 4.3, Eosinophils % 0.6, Basophils % 0.4, 

Absolute Neutrophils 11.3 H, Absolute Lymphocytes 1.3, Absolute Monocytes 0.6, 

Absolute Eosinophils 0.1, Absolute Basophils 0.1, Giant Platelets Present, 

Anisocytosis 1+, Morphology Comment Noted





Microbiology Data (last 24 hrs): 








12/05/19 10:35   Sputum   Gram Stain - Final


12/05/19 09:38   Nasopharnyx   Influenza Type A Antigen Screen - Final


12/05/19 09:38   Nasopharnyx   Influenza Type B Antigen Screen - Final





Medications List Reviewed: Yes





Assessment & Plan





- Problems (Diagnosis)


(1) Bacterial lobar pneumonia


Current Visit: Yes   Status: Acute   





(2) COPD (chronic obstructive pulmonary disease)


Current Visit: No   Status: Acute   


Qualifiers: 


   COPD type: COPD with acute exacerbation   Qualified Code(s): J44.1 - Chronic 

obstructive pulmonary disease with (acute) exacerbation   





(3) Hepatitis C


Current Visit: No   Status: Chronic   


Qualifiers: 


   Viral hepatitis chronicity: unspecified   Hepatic coma status: without 

hepatic coma   Qualified Code(s): B19.20 - Unspecified viral hepatitis C 

without hepatic coma   





(4) Tobacco abuse


Onset Date: 11/16/17   Current Visit: No   Status: Chronic   


Discharge Plan: Home


Plan to discharge in: 72 Hours





- Code Status/Comfort Care


Code Status Assessed: Yes


Code Status: Full Code


Physician Review Additional Text: 





# b/l PNA - on abx 


- follow sputum c/s and vblood cx - no ghrowth till date 


- c/w 02 


- slow recovery expected , may need home 02 when clinically stable for stable 

since still recurrent desatuartaion with activity 








# COPD with congestion -c/w nebs , add mucomyst neb and mucinex bid 








# Tob use -cessation advised , start nicotine patch 





# DVT prop - sc heparin 





full code 


Time Spent Managing Pts Care (In Minutes): 30

## 2019-12-06 NOTE — ECHO
HEIGHT: 5 ft 10 in   WEIGHT: 180 lb 0 oz   DATE OF STUDY: 12/06/2019   REFER DR: Ben Toney MD

2-DIMENSIONAL: YES

     M.MODE: YES

 DOPPLER: YES

COLOR FLOW: YES



                    TDS:  

PORTABLE: 

 DEFINITY:  

BUBBLE STUDY: 





DIAGNOSIS:  RULE OUT CONGESTIVE HEART FAILURE



CARDIAC HISTORY:  

CATHERIZATION: NO

SURGERY: NO

PROSTHETIC VALVE: NO

PACEMAKER: NO





MEASUREMENTS (cm)

    DIASTOLIC (NORMALS)                 SYSTOLIC (NORMALS)

IVSd                 0.9 (0.6-1.2)                    LA Diam 3.3 (1.9-4.0)     LVEF       
  56%  

LVIDd               5.1 (3.5-5.7)                        LVIDs      3.6 (2.0-3.5)     %FS  
        29%

LVPWd             1.0 (0.6-1.2)

Ao Diam           2.6 (2.0-3.7)



2 DIMENSIONAL ASSESSMENT:

RIGHT ATRIUM:                   NORMAL

LEFT ATRIUM:       NORMAL



RIGHT VENTRICLE:            NORMAL

LEFT VENTRICLE: NORMAL



TRICUSPID VALVE:             NORMAL

MITRAL VALVE:     NORMAL



PULMONIC VALVE:             NORMAL

AORTIC VALVE:     NORMAL



PERICARDIAL EFFUSION: NONE

AORTIC ROOT:      NORMAL





LEFT VENTRICULAR WALL MOTION:     NORMAL



DOPPLER/COLOR FLOW:     NORMAL



COMMENTS:      NORMAL 2-DIMENSIONAL ECHOCARDIOGRAM WITH DOPPLER.



TECHNOLOGIST:   ROBIN HADLEY
yes

## 2019-12-06 NOTE — P.CNS
Date of Consult: 12/06/19


Primary Care Provider: Dr. Bolivar; Hematology-Dr. Bennett


Chief Complaint: Bilateral pneumonia


History of Present Illness: 





This 44 years of age a heavy 2 pack-a-day smoker has been complaining of 

worsening dyspnea for the past 4 days.  He denies any prior history of 

cardiopulmonary problems no dyspnea on exertion or was also having some chills 

no cough no chest pain is doing somewhat better no prior history of chronic 

obstructive pulmonary disease


Allergies





No Known Drug Allergies Allergy (Verified 11/13/19 10:46)


 Unknown


No Known Allergies Allergy (Uncoded 11/13/19 10:46)


 Unknown





Home Medications: 








Buprenorphine HCl/Naloxone HCl [Buprenorp-Nalox 8-2 mg Sl Film] 1 film SL TID 12 /05/19 








- Past Medical/Surgical History


Diabetic: No


-: Chronic ITP


-: Chronic herpes simplex virus


-: Hepatitis-C


-: Tobacco abuse


-: Rhinoplasty


-: Tonsillectomy with adenoidectomy


-: Appendectomy


-: appendectomy


Psychosocial/ Personal History: Patient is .





- Family History


  ** Father


Medical History: Cancer (Lung cancer with mesothelioma)


Notes: lung cancer





  ** Mother


Medical History: Heart disease





- Social History


Smoking Status: Current every day smoker


Alcohol use: No


CD- Drugs: No


Caffeine use: Yes


Place of Residence: Home





Review of Systems


Unremarkable


General: Weakness


Respiratory: Cough, Shortness of Breath





Physical Examination














Temp Pulse Resp BP Pulse Ox


 


 97.2 F   66   22 H  124/77   92 


 


 12/06/19 11:19  12/06/19 11:19  12/06/19 11:19  12/06/19 11:19  12/06/19 11:19








General: Alert, Oriented x3


HEENT: Atraumatic


Neck: Supple


Respiratory: Expiratory wheezes


Cardiovascular: No edema, Regular rate/rhythm, Normal S1 S2


Gastrointestinal: Normal bowel sounds, Soft and benign





- Problems


(1) Bacterial lobar pneumonia


Current Visit: Yes   Status: Acute   


Plan: 


Patient is 44 years of age admitted with pneumonia chest x-ray shows bilateral 

interstitial changes white count is elevated history of idiopathic 

thrombocytopenic purpura in addition to hepatitis-C is white count is mildly 

elevated vital signs stable check room air pulse ox Dc IV fluids give patient a 

small dose of Lasix patient is stable change to p.o. levofloxacin patient's 

vital signs are stable can be discharged home follow with me in 2 weeks is been 

console not to smoke recommend using Chantix and nicotine replacement therapy 

patient's cultures are all negative may have atypical pneumonia

## 2019-12-07 LAB
ALBUMIN SERPL BCP-MCNC: 2.7 G/DL (ref 3.4–5)
ALP SERPL-CCNC: 85 U/L (ref 45–117)
ALT SERPL W P-5'-P-CCNC: 19 U/L (ref 12–78)
AST SERPL W P-5'-P-CCNC: 21 U/L (ref 15–37)
BUN BLD-MCNC: 13 MG/DL (ref 7–18)
GLUCOSE SERPLBLD-MCNC: 110 MG/DL (ref 74–106)
HCT VFR BLD CALC: 40.5 % (ref 39.6–49)
LYMPHOCYTES # SPEC AUTO: 1.5 K/UL (ref 0.7–4.9)
PMV BLD: 11.9 FL (ref 7.6–11.3)
POTASSIUM SERPL-SCNC: 5.2 MMOL/L (ref 3.5–5.1)
RBC # BLD: 4.68 M/UL (ref 4.33–5.43)

## 2019-12-07 RX ADMIN — SODIUM CHLORIDE SCH MLS: 0.9 INJECTION, SOLUTION INTRAVENOUS at 08:00

## 2019-12-07 RX ADMIN — IPRATROPIUM BROMIDE SCH MG: 0.5 SOLUTION RESPIRATORY (INHALATION) at 13:20

## 2019-12-07 RX ADMIN — Medication SCH MG: at 20:31

## 2019-12-07 RX ADMIN — IPRATROPIUM BROMIDE SCH MG: 0.5 SOLUTION RESPIRATORY (INHALATION) at 08:00

## 2019-12-07 RX ADMIN — ALBUTEROL SULFATE PRN MG: 2.5 SOLUTION RESPIRATORY (INHALATION) at 01:30

## 2019-12-07 RX ADMIN — IPRATROPIUM BROMIDE SCH MG: 0.5 SOLUTION RESPIRATORY (INHALATION) at 02:00

## 2019-12-07 RX ADMIN — NICOTINE SCH MG: 21 PATCH TRANSDERMAL at 08:14

## 2019-12-07 RX ADMIN — MOMETASONE FUROATE AND FORMOTEROL FUMARATE DIHYDRATE SCH PUFF: 100; 5 AEROSOL RESPIRATORY (INHALATION) at 08:14

## 2019-12-07 RX ADMIN — Medication SCH: at 21:00

## 2019-12-07 RX ADMIN — Medication SCH MG: at 08:13

## 2019-12-07 RX ADMIN — IPRATROPIUM BROMIDE SCH MG: 0.5 SOLUTION RESPIRATORY (INHALATION) at 20:00

## 2019-12-07 RX ADMIN — CEFTRIAXONE SCH MLS: 1 INJECTION, SOLUTION INTRAVENOUS at 08:13

## 2019-12-07 RX ADMIN — Medication SCH: at 08:13

## 2019-12-07 RX ADMIN — Medication SCH ML: at 20:31

## 2019-12-07 RX ADMIN — Medication SCH ML: at 08:15

## 2019-12-07 RX ADMIN — MOMETASONE FUROATE AND FORMOTEROL FUMARATE DIHYDRATE SCH: 100; 5 AEROSOL RESPIRATORY (INHALATION) at 21:00

## 2019-12-07 RX ADMIN — Medication SCH: at 12:04

## 2019-12-07 NOTE — P.PN
Subjective


Date of Service: 12/18/19


Primary Care Provider: Dr. Bolivar; Hematology-Dr. Bennett


Chief Complaint: Bilateral pneumonia


Subjective: Improving (Patient is doing much better still has some coughing 

spells)





Review of Systems


Unremarkable





Physical Examination





- Vital Signs


Temperature: 97.8 F


Blood Pressure: 107/70


Pulse: 60


Respirations: 20


Pulse Ox (%): 95





- Physical Exam


General: Alert, In no apparent distress, Oriented x3


Respiratory: Clear to auscultation bilaterally


Cardiovascular: No edema, Regular rate/rhythm





- Studies


Microbiology Data (last 24 hrs): 








12/05/19 10:35   Sputum   Gram Stain - Final





Medications List Reviewed: Yes





Assessment And Plan





- Current Problems (Diagnosis)


(1) Bacterial lobar pneumonia


Status: Acute   


Plan: 


Patient admitted with bilateral pneumonia heavy smoker clinically is improving 

white count is normal afebrile vital signs all stable patient is hypoxic 87% on 

room air increase prednisone change to p.o. levofloxacin Dc IV Zithromax and 

Rocephin patient has Haemophilus isolated in the sputum should be sensitive to 

levofloxacin white count is now normal possible discharge tomorrow to follow up 

with me in 2 weeks normal renal function

## 2019-12-07 NOTE — P.PN
Subjective


Date of Service: 12/07/19


Primary Care Provider: Dr. Bolivar; Hematology-Dr. Bennett


Chief Complaint: Bilateral pneumonia


Subjective: No new changes, No C/O voiced (feel muchy better , coughing spells 

improved now), Doing well





Review of Systems


10-point ROS is otherwise unremarkable





Physical Examination





- Vital Signs


Temperature: 97.8 F


Blood Pressure: 107/70


Pulse: 60


Respirations: 20


Pulse Ox (%): 95





- Physical Exam


General: Alert, In no apparent distress, Oriented x3


HEENT: Atraumatic, Normocephalic, PERRLA


Neck: Supple, 2+ carotid pulse no bruit


Respiratory: Clear to auscultation bilaterally, Normal air movement


Cardiovascular: No edema, Normal pulses, Regular rate/rhythm, Normal S1 S2


Neurological: Normal gait, Normal speech, Normal strength at 5/5 x4 extr





- Studies





 Laboratory Last Values











WBC  10.8 K/uL (4.3-10.9)  D 12/07/19  05:45    


 


RBC  4.68 M/uL (4.33-5.43)   12/07/19  05:45    


 


Hgb  13.8 g/dL (13.6-17.9)   12/07/19  05:45    


 


Hct  40.5 % (39.6-49.0)   12/07/19  05:45    


 


MCV  86.5 fL ()   12/07/19  05:45    


 


MCH  29.5 pg (27.0-35.0)   12/07/19  05:45    


 


MCHC  34.1 g/dL (32.0-36.0)   12/07/19  05:45    


 


RDW  14.4 % (12.1-15.2)   12/07/19  05:45    


 


Plt Count  80 K/uL (152-406)  L D 12/07/19  05:45    


 


MPV  11.9 fL (7.6-11.3)  H  12/07/19  05:45    


 


Neutrophils %  80.3 % (41.7-73.7)  H  12/07/19  05:45    


 


Lymphocytes %  14.2 % (15.3-44.8)  L  12/07/19  05:45    


 


Monocytes %  5.0 % (3.3-12.3)   12/07/19  05:45    


 


Eosinophils %  0.3 % (0-4.4)   12/07/19  05:45    


 


Basophils %  0.2 % (0-1.3)   12/07/19  05:45    


 


Absolute Neutrophils  8.7 K/uL (1.8-8.0)  H  12/07/19  05:45    


 


Absolute Lymphocytes  1.5 K/uL (0.7-4.9)   12/07/19  05:45    


 


Absolute Monocytes  0.5 K/uL (0.1-1.3)   12/07/19  05:45    


 


Absolute Eosinophils  0.0 K/uL (0-0.5)   12/07/19  05:45    


 


Absolute Basophils  0.0 K/uL (0-0.5)   12/07/19  05:45    


 


Giant Platelets  Present   12/05/19  10:25    


 


Anisocytosis  1+   12/05/19  10:25    


 


Morphology Comment  Noted  (NOT SEEN)   12/05/19  10:25    


 


Sodium  138 mmol/L (136-145)   12/07/19  05:44    


 


Potassium  5.2 mmol/L (3.5-5.1)  H  12/07/19  05:44    


 


Chloride  104 mmol/L ()   12/07/19  05:44    


 


Carbon Dioxide  33 mmol/L (21-32)  H  12/07/19  05:44    


 


BUN  13 mg/dL (7-18)   12/07/19  05:44    


 


Creatinine  0.74 mg/dL (0.55-1.3)   12/07/19  05:44    


 


Estimated GFR  > 90 mL/min (=/>90)   12/07/19  05:44    


 


Glucose  110 mg/dL ()  H  12/07/19  05:44    


 


Lactic Acid  1.1 mmol/L (0.4-2.0)   12/06/19  05:18    


 


Calcium  9.0 mg/dL (8.5-10.1)   12/07/19  05:44    


 


Phosphorus  3.7 mg/dL (2.5-4.9)   12/06/19  05:18    


 


Magnesium  2.2 mg/dL (1.8-2.4)   12/06/19  05:18    


 


Total Bilirubin  0.4 mg/dL (0.2-1.0)   12/07/19  05:44    


 


AST  21 U/L (15-37)   12/07/19  05:44    


 


ALT  19 U/L (12-78)   12/07/19  05:44    


 


Alkaline Phosphatase  85 U/L ()   12/07/19  05:44    


 


Creatine Kinase  56 U/L ()   12/05/19  09:49    


 


Rapid Troponin I  < 0.02 ng/mL (0.0-0.045)   12/05/19  09:49    


 


Serum Total Protein  6.8 g/dL (6.4-8.2)   12/07/19  05:44    


 


Albumin  2.7 g/dL (3.4-5.0)  L  12/07/19  05:44    


 


Globulin  4.1 g/dL (2.3-3.5)  H  12/07/19  05:44    


 


Albumin/Globulin Ratio  0.7  (1.1-1.8)  L  12/07/19  05:44    


 


Triglycerides  68 mg/dL (<150)   12/06/19  05:18    


 


Cholesterol  198 mg/dL (<200)   12/06/19  05:18    


 


LDL Cholesterol, Calc  129  (<130)   12/06/19  05:18    


 


HDL Cholesterol  55 mg/dL (40-60)   12/06/19  05:18    


 


Cholesterol/HDL Ratio  3.60   12/06/19  05:18    


 


Procalcitonin  0.96 ng/mL (<0.50)  H  12/05/19  09:49    


 


Urine Color  Yellow   12/06/19  17:45    


 


Urine Appearance  Clear   12/06/19  17:45    


 


Urine pH  5.5  (5.0-7.0)   12/06/19  17:45    


 


Ur Specific Gravity  <=1.005  (1.005-1.030)   12/06/19  17:45    


 


Urine Ketones  Negative  (NEG)   12/06/19  17:45    


 


Urine Blood  Negative  (NEG)   12/06/19  17:45    


 


Urine Nitrite  Negative  (NEG)   12/06/19  17:45    


 


Urine Bilirubin  Negative  (NEG)   12/06/19  17:45    


 


Urine Urobilinogen  0.2 mg/dL (0.2-1.0)   12/06/19  17:45    


 


Ur Leukocyte Esterase  Negative  (NEG)   12/06/19  17:45    


 


Urine RBC  <5 /HPF (NONE SEEN)   12/06/19  17:45    


 


Urine WBC  None seen /HPF (<5)   12/06/19  17:45    


 


Ur Squamous Epith Cells  <5 /HPF (NONE SEEN)   12/06/19  17:45    


 


Urine Bacteria  <20 /HPF (NONE SEEN)   12/06/19  17:45    


 


Urine Culture Reflexed  Not needed   12/06/19  17:45    


 


Urine Glucose  Negative  (NEG)   12/06/19  17:45    


 


Urine Total Protein  Negative  (NEG)   12/06/19  17:45    











Microbiology Data (last 24 hrs): 








12/05/19 10:35   Sputum   Gram Stain - Final





Medications List Reviewed: Yes





Assessment & Plan





- Problems (Diagnosis)


(1) Bacterial lobar pneumonia


Current Visit: Yes   Status: Acute   





(2) COPD (chronic obstructive pulmonary disease)


Current Visit: No   Status: Acute   


Qualifiers: 


   COPD type: COPD with acute exacerbation   Qualified Code(s): J44.1 - Chronic 

obstructive pulmonary disease with (acute) exacerbation   





(3) Hepatitis C


Current Visit: No   Status: Chronic   


Qualifiers: 


   Viral hepatitis chronicity: unspecified   Hepatic coma status: without 

hepatic coma   Qualified Code(s): B19.20 - Unspecified viral hepatitis C 

without hepatic coma   





(4) Tobacco abuse


Onset Date: 11/16/17   Current Visit: No   Status: Chronic   


Discharge Plan: Home


Plan to discharge in: 24 Hours





- Code Status/Comfort Care


Code Status Assessed: Yes


Code Status: Full Code


Physician Review Additional Text: 





# b/l PNA - with hemophilus species on sputum culture 


-follow follow sensitivity 


- improving 02 need , now tapering down to 1.5 L NC 02 now though still low sat 

with ambulation 


-will continue to wean off as tolerated 











# COPD with congestion -improved


-c/w duonebs/mucomyst neb and mucinex bid 








# Tob use -c/w  nicotine patch 





# DVT prop - sc heparin 





full code 





possible dc in am if able to wean off 02 to room air

## 2019-12-08 VITALS — OXYGEN SATURATION: 94 %

## 2019-12-08 LAB
HCT VFR BLD CALC: 44.2 % (ref 39.6–49)
LYMPHOCYTES # SPEC AUTO: 1.4 K/UL (ref 0.7–4.9)
PMV BLD: 11.5 FL (ref 7.6–11.3)
RBC # BLD: 5.04 M/UL (ref 4.33–5.43)

## 2019-12-08 RX ADMIN — Medication SCH MG: at 07:40

## 2019-12-08 RX ADMIN — IPRATROPIUM BROMIDE SCH MG: 0.5 SOLUTION RESPIRATORY (INHALATION) at 07:50

## 2019-12-08 RX ADMIN — IPRATROPIUM BROMIDE SCH MG: 0.5 SOLUTION RESPIRATORY (INHALATION) at 02:20

## 2019-12-08 RX ADMIN — IPRATROPIUM BROMIDE SCH MG: 0.5 SOLUTION RESPIRATORY (INHALATION) at 13:40

## 2019-12-08 RX ADMIN — MOMETASONE FUROATE AND FORMOTEROL FUMARATE DIHYDRATE SCH PUFF: 100; 5 AEROSOL RESPIRATORY (INHALATION) at 09:20

## 2019-12-08 RX ADMIN — Medication SCH: at 07:42

## 2019-12-08 RX ADMIN — NICOTINE SCH MG: 21 PATCH TRANSDERMAL at 07:41

## 2019-12-08 RX ADMIN — Medication SCH ML: at 07:42

## 2019-12-08 NOTE — P.DS
Admission Date: 12/05/19


Discharge Date: 12/08/19


Primary Care Provider: Dr. Bolivar; Hematology-Dr. Bennett


Disposition: DC HOME/HOME HEALTH CARE


Discharge Condition: FAIR


Reason for Admission: Bilateral pneumonia


Consultations: 





Pulmonary 





- Problems


(1) Bacterial lobar pneumonia


Current Visit: Yes   Status: Acute   





(2) COPD (chronic obstructive pulmonary disease)


Current Visit: No   Status: Acute   


Qualifiers: 


   COPD type: COPD with acute exacerbation   Qualified Code(s): J44.1 - Chronic 

obstructive pulmonary disease with (acute) exacerbation   





(3) Hepatitis C


Current Visit: No   Status: Chronic   


Qualifiers: 


   Viral hepatitis chronicity: unspecified   Hepatic coma status: without 

hepatic coma   Qualified Code(s): B19.20 - Unspecified viral hepatitis C 

without hepatic coma   





(4) Tobacco abuse


Onset Date: 11/16/17   Current Visit: No   Status: Chronic   


Brief History of Present Illness: 





istory of Present Illness: 





44-year-old  male presented to the emergency room with cough, 

congestion and shortness of breath.  Patient with underlying hepatitis-C, 

chronic ITP, and chronic pain.





Over the last 3 days patient has been having increasing cough, congestion and 

shortness of breath.  Shortness of breath was worse.  His wife provided him 

albuterol without any significant change.  Room-air saturations at home range 

around 84. Patient with fever, chills.





In the ER patient was evaluated.  White count slightly elevated.  Platelet 

count 62.  BMP reviewed.  Pro calcitonin elevated.  Chest x-ray showed 

bilateral pneumonia left greater than right.  Patient admitted for further 

evaluation and treatment.





When I saw the patient in the hospital room, patient was appropriate and alert.

  Patient appeared stable.  Wife at bedside.  Patient did not look like in any 

respiratory distress.


Hospital Course: 





Patient was noted with bilateral pneumonia likely CAP . HE WAS STARTED ON 

ANTIBIOTICS AND CX GROW POSSIBLE HEMOPHiLUS SP . He was evaluated by pulmonary 

also . He have some component of COPD with acute flare . His hospital course 

included slow to wean off oxygen and was felt that his COPD was stable enough 

to warrant chronic oxygen use for now . Case mgt has arranged for home 02 use 

and he will follow with pulmonary in 1-2 weeks . Cessation of tobacco use 

counselling was done and nicotine patches given 


Vital Signs/Physical Exam: 














Temp Pulse Resp BP Pulse Ox


 


 96.9 F   65   16   124/74   100 


 


 12/08/19 08:00  12/08/19 08:00  12/08/19 08:00  12/08/19 08:00  12/08/19 08:00








General: Alert, In no apparent distress, Oriented x3


HEENT: Atraumatic, Normocephalic


Neck: Supple, 2+ carotid pulse no bruit


Respiratory: Clear to auscultation bilaterally, Expiratory wheezes (with 

coughing only )


Cardiovascular: No edema, Normal pulses, Regular rate/rhythm


Gastrointestinal: Normal bowel sounds, Soft and benign, No tenderness


Musculoskeletal: No clubbing, No swelling


Neurological: Normal speech, Sensation intact, Cranial nerves 3-12 intact


Laboratory Data at Discharge: 














WBC  11.1 K/uL (4.3-10.9)  H  12/08/19  03:44    


 


Hgb  14.7 g/dL (13.6-17.9)   12/08/19  03:44    


 


Hct  44.2 % (39.6-49.0)   12/08/19  03:44    


 


Plt Count  114 K/uL (152-406)  L D 12/08/19  03:44    


 


Sodium  138 mmol/L (136-145)   12/07/19  05:44    


 


Potassium  5.2 mmol/L (3.5-5.1)  H  12/07/19  05:44    


 


BUN  13 mg/dL (7-18)   12/07/19  05:44    


 


Creatinine  0.74 mg/dL (0.55-1.3)   12/07/19  05:44    


 


Glucose  110 mg/dL ()  H  12/07/19  05:44    


 


Phosphorus  3.7 mg/dL (2.5-4.9)   12/06/19  05:18    


 


Magnesium  2.2 mg/dL (1.8-2.4)   12/06/19  05:18    


 


Total Bilirubin  0.4 mg/dL (0.2-1.0)   12/07/19  05:44    


 


AST  21 U/L (15-37)   12/07/19  05:44    


 


ALT  19 U/L (12-78)   12/07/19  05:44    


 


Alkaline Phosphatase  85 U/L ()   12/07/19  05:44    


 


Triglycerides  68 mg/dL (<150)   12/06/19  05:18    


 


Cholesterol  198 mg/dL (<200)   12/06/19  05:18    


 


HDL Cholesterol  55 mg/dL (40-60)   12/06/19  05:18    


 


Cholesterol/HDL Ratio  3.60   12/06/19  05:18    








Home Medications: 








Buprenorphine HCl/Naloxone HCl [Buprenorp-Nalox 8-2 mg Sl Film] 1 film SL TID 12 /05/19 


Albuterol Neb [Proventil 0.083% Neb Soln] 2.5 mg NEB [id number] PRN #20 amp 12/08/ 19 


Benzonatate [Tessalon Perle*] 100 mg PO TID PRN #30 cap 12/08/19 


Ipratropium Neb [Atrovent*] 0.5 mg NEB [id number] #20 amp 12/08/19 


Mometasone/Formoterol [Dulera 100 Mcg/5 Mcg Inhaler] 2 puff IH BID #1 inhaler 12 /08/19 


Nicotine [Nicoderm*] 21 mg TD DAILY #10 patch.td24 12/08/19 


levoFLOXacin [Levaquin*] 500 mg PO DAILY #7 tab 12/08/19 


predniSONE [Deltasone*] 1 mg PO DAILY #26 tab 12/08/19 





New Medications: 


Albuterol Neb [Proventil 0.083% Neb Soln] 2.5 mg NEB [id number] PRN #20 amp


 PRN Reason: Wheezing


Benzonatate [Tessalon Perle*] 100 mg PO TID PRN #30 cap


 PRN Reason: Cough


Ipratropium Neb [Atrovent*] 0.5 mg NEB [id number] #20 amp


levoFLOXacin [Levaquin*] 500 mg PO DAILY #7 tab


Mometasone/Formoterol [Dulera 100 Mcg/5 Mcg Inhaler] 2 puff IH BID #1 inhaler


Nicotine [Nicoderm*] 21 mg TD DAILY #10 patch.td24


predniSONE [Deltasone*] 1 mg PO DAILY #26 tab


Patient Discharge Instructions: - wear oxygen at all times for now


Diet: Regular


Activity: Ad judy


Followup: 


Ben Toney MD [ACTIVE - CAN ADMIT] - 1-2 Weeks


Physician Review: Patient Assessed, Agree with Above Assessment and Plan


Time spent managing pt's care (in minutes): 35

## 2019-12-18 VITALS — DIASTOLIC BLOOD PRESSURE: 70 MMHG | TEMPERATURE: 97.8 F | SYSTOLIC BLOOD PRESSURE: 107 MMHG
